# Patient Record
Sex: MALE | Race: WHITE | NOT HISPANIC OR LATINO | Employment: OTHER | URBAN - METROPOLITAN AREA
[De-identification: names, ages, dates, MRNs, and addresses within clinical notes are randomized per-mention and may not be internally consistent; named-entity substitution may affect disease eponyms.]

---

## 2017-03-09 ENCOUNTER — HOSPITAL ENCOUNTER (EMERGENCY)
Facility: HOSPITAL | Age: 42
Discharge: HOME | End: 2017-03-09
Attending: EMERGENCY MEDICINE
Payer: MEDICAID

## 2017-03-09 VITALS
SYSTOLIC BLOOD PRESSURE: 156 MMHG | WEIGHT: 200 LBS | HEART RATE: 75 BPM | HEIGHT: 69 IN | DIASTOLIC BLOOD PRESSURE: 88 MMHG | TEMPERATURE: 98 F | RESPIRATION RATE: 20 BRPM | OXYGEN SATURATION: 96 % | BODY MASS INDEX: 29.62 KG/M2

## 2017-03-09 DIAGNOSIS — M62.838 CERVICAL PARASPINAL MUSCLE SPASM: Primary | ICD-10-CM

## 2017-03-09 PROCEDURE — 99283 EMERGENCY DEPT VISIT LOW MDM: CPT | Mod: 25

## 2017-03-09 PROCEDURE — 63600175 PHARM REV CODE 636 W HCPCS: Performed by: PHYSICIAN ASSISTANT

## 2017-03-09 PROCEDURE — 96372 THER/PROPH/DIAG INJ SC/IM: CPT

## 2017-03-09 PROCEDURE — 25000003 PHARM REV CODE 250: Performed by: PHYSICIAN ASSISTANT

## 2017-03-09 RX ORDER — ETODOLAC 300 MG/1
300 CAPSULE ORAL 2 TIMES DAILY
Qty: 14 CAPSULE | Refills: 0 | Status: SHIPPED | OUTPATIENT
Start: 2017-03-09 | End: 2017-03-25

## 2017-03-09 RX ORDER — METHOCARBAMOL 500 MG/1
500 TABLET, FILM COATED ORAL 3 TIMES DAILY
Qty: 15 TABLET | Refills: 0 | Status: SHIPPED | OUTPATIENT
Start: 2017-03-09 | End: 2017-03-14

## 2017-03-09 RX ORDER — KETOROLAC TROMETHAMINE 30 MG/ML
10 INJECTION, SOLUTION INTRAMUSCULAR; INTRAVENOUS
Status: COMPLETED | OUTPATIENT
Start: 2017-03-09 | End: 2017-03-09

## 2017-03-09 RX ORDER — METHOCARBAMOL 500 MG/1
500 TABLET, FILM COATED ORAL
Status: COMPLETED | OUTPATIENT
Start: 2017-03-09 | End: 2017-03-09

## 2017-03-09 RX ADMIN — METHOCARBAMOL 500 MG: 500 TABLET ORAL at 12:03

## 2017-03-09 RX ADMIN — KETOROLAC TROMETHAMINE 10 MG: 30 INJECTION, SOLUTION INTRAMUSCULAR at 12:03

## 2017-03-09 NOTE — ED PROVIDER NOTES
Encounter Date: 3/9/2017    SCRIBE #1 NOTE: I, Dawit Mendoza, am scribing for, and in the presence of,  Yvan Walsh PA-C. I have scribed the following portions of the note - Other sections scribed: HPI, ROS.       History     Chief Complaint   Patient presents with    Shoulder Pain     left for the past few days, worse with movement, denies chest pain. unsure if he injured it     Review of patient's allergies indicates:  No Known Allergies  HPI Comments: CC: Shoulder Pain    HPI: This 42 y.o. male with a PMHx of asthma and pneumonia presents to the ED c/o progressive worsening left shoulder pain that radiates into his neck. 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 The pain is exacerbated with movement. No other symptoms reported. Pt denies any numbness or tingling in his extremities. Pt denies any fever, skin infections, IV drug use, headaches, or extremity weakness.       The history is provided by the patient.     Past Medical History:   Diagnosis Date    Asthma     Pneumonia      History reviewed. No pertinent surgical history.  Family History   Problem Relation Age of Onset    Hypertension Mother     Heart disease Father     COPD Father      Social History   Substance Use Topics    Smoking status: Current Every Day Smoker     Types: Cigarettes    Smokeless tobacco: None    Alcohol use No     Review of Systems   Constitutional: Negative for chills and fever.   HENT: Negative for congestion and sore throat.    Eyes: Negative for visual disturbance.   Respiratory: Negative for cough and shortness of breath.    Cardiovascular: Negative for chest pain.   Gastrointestinal: Negative for abdominal pain, diarrhea, nausea and vomiting.   Genitourinary: Negative for dysuria.   Musculoskeletal: Positive for myalgias (L Shoulder) and neck pain (L). Negative for back pain.   Skin: Negative for rash.   Neurological: Negative for weakness, numbness and headaches.    Hematological: Does not bruise/bleed easily.       Physical Exam   Initial Vitals   BP Pulse Resp Temp SpO2   03/09/17 1055 03/09/17 1055 03/09/17 1055 03/09/17 1055 03/09/17 1055   153/87 89 17 97.8 °F (36.6 °C) 99 %     Physical Exam    Vitals reviewed.  Constitutional: He appears well-developed and well-nourished. He is not diaphoretic. No distress.   HENT:   Head: Normocephalic and atraumatic.   Right Ear: External ear normal.   Left Ear: External ear normal.   Nose: Nose normal.   Eyes: Conjunctivae are normal. No scleral icterus.   Neck: Normal range of motion. Neck supple.   Cardiovascular: Normal rate, regular rhythm, normal heart sounds and intact distal pulses.   Pulmonary/Chest: Breath sounds normal. No respiratory distress. He has no wheezes. He has no rhonchi. He has no rales. He exhibits no tenderness.   Abdominal: Soft. He exhibits no distension and no mass. There is no tenderness. There is no rebound and no guarding.   Musculoskeletal: Normal range of motion. He exhibits tenderness (left trapezius). He exhibits no edema.   Left trapezius pain with abduction of left arm.  No C-spine tenderness.  No crepitus or dislocation of left shoulder.  No tenderness to the AC or glenohumeral joint.       Neurological: He is alert and oriented to person, place, and time.   Skin: Skin is warm and dry. No rash noted. No erythema.         ED Course   Procedures  Labs Reviewed - No data to display          Medical Decision Making:   History:   Old Medical Records: I decided to obtain old medical records.    42-year-old male with asthma complains of left shoulder pain ×2 days.  Patient performs manual labor regularly.  He denies fever, swelling, rash, IV drug use.  He presents well appearing in no distress with stable vital signs.  He has pain to the left trapezius with abduction of left arm.  There is mild tenderness of the left trapezius.  No evidence of C-spine injury.  Upper extremities are neurovascularly  intact.  Shoulder x-ray unremarkable.  I do not suspect dislocation, impingement, radiculopathy, lymphadenopathy.  Patient likely has a muscle strain versus spasm of the cervical paraspinal/trapezius.  Will treat with NSAIDs and Robaxin.  Return precautions given. Case discussed with Dr. Bhatt.          Scribe Attestation:   Scribe #1: I performed the above scribed service and the documentation accurately describes the services I performed. I attest to the accuracy of the note.    Attending Attestation:     Physician Attestation Statement for NP/PA:   I discussed this assessment and plan of this patient with the NP/PA, but I did not personally examine the patient. The face to face encounter was performed by the NP/PA.    Other NP/PA Attestation Additions:      Medical Decision Making: Agree with assessment and management.  Unremarkable shoulder x-rays.       Physician Attestation for Scribe:  Physician Attestation Statement for Scribe #1: I, Yvan Walsh PA-C, reviewed documentation, as scribed by Dawit Mendoza in my presence, and it is both accurate and complete.                 ED Course     Clinical Impression:   The encounter diagnosis was Cervical paraspinal muscle spasm.          Yvan Walsh PA-C  03/09/17 1216       Hector Bhatt MD  03/09/17 1300

## 2017-03-09 NOTE — ED AVS SNAPSHOT
[facility]  [place] [address]               Greg Rogers Jr.   3/9/2017 11:38 AM   ED    Description:  Male : 1975   Department:  [facility]           Your Care was Coordinated By:     Provider Role From To    Robinson Moreau MD Attending Provider 17 1140 17 1150    Hector Bhatt MD Attending Provider 17 1150 --    Yvan Walsh PA-C Physician Assistant 17 1140 --      Reason for Visit     Shoulder Pain           Diagnoses this Visit        Comments    Cervical paraspinal muscle spasm    -  Primary       ED Disposition     None           To Do List           Follow-up Information     Go to [facility].    Specialty:  Emergency Medicine    Why:  If symptoms worsen    Contact information:    [place] [id number] [phone]       These Medications        Disp Refills Start End    methocarbamol (ROBAXIN) 500 MG Tab 15 tablet 0 3/9/2017 3/14/2017    Take 1 tablet (500 mg total) by mouth 3 (three) times daily. - Oral    etodolac (LODINE) 300 MG Cap 14 capsule 0 3/9/2017     Take 1 capsule (300 mg total) by mouth 2 (two) times daily. - Oral      [facility]     [facility] Nurse Care [facility] -  Assistance  Registered nurses in the [facility] provide clinical advisement, health education, appointment booking, and other advisory services.  Call for this free service at [phone].             Medications           Message regarding Medications     Verify the changes and/or additions to your medication regime listed below are the same as discussed with your clinician today.  If any of these changes or additions are incorrect, please notify your healthcare provider.        START taking these NEW medications        Refills    methocarbamol (ROBAXIN) 500 MG Tab 0    Sig: Take 1 tablet (500 mg total) by mouth 3 (three) times daily.     "Class: Print    Route: Oral    etodolac (LODINE) 300 MG Cap 0    Sig: Take 1 capsule (300 mg total) by mouth 2 (two) times daily.    Class: Print    Route: Oral      These medications were administered today        Dose Freq    ketorolac injection 10 mg 10 mg ED 1 Time    Sig: Inject 10 mg into the muscle ED 1 Time.    Class: Normal    Route: Intramuscular    Cosign for Ordering: Required by Robinson Moreau MD    methocarbamol tablet 500 mg 500 mg ED 1 Time    Sig: Take 1 tablet (500 mg total) by mouth ED 1 Time.    Class: Normal    Route: Oral    Cosign for Ordering: Required by Robinson Moreau MD      STOP taking these medications     prednisoLONE (ORAPRED) 15 mg/5 mL solution Take 60mg on day 1, 50 mg on day 2, 40 mg on day 3, 30 mg on day 4, 15 mg on day 5    predniSONE (DELTASONE) 20 MG tablet 3 tablets po daily x 3 days  2 tablets po daily x 3 days  1 tablet po daily x 3 days    mupirocin (BACTROBAN) 2 % ointment Apply topically 3 (three) times daily.           Verify that the below list of medications is an accurate representation of the medications you are currently taking.  If none reported, the list may be blank. If incorrect, please contact your healthcare provider. Carry this list with you in case of emergency.           Current Medications     albuterol 90 mcg/actuation inhaler Inhale 1-2 puffs into the lungs every 6 (six) hours as needed for Wheezing.    etodolac (LODINE) 300 MG Cap Take 1 capsule (300 mg total) by mouth 2 (two) times daily.    ketorolac injection 10 mg Inject 10 mg into the muscle ED 1 Time.    methocarbamol (ROBAXIN) 500 MG Tab Take 1 tablet (500 mg total) by mouth 3 (three) times daily.    methocarbamol tablet 500 mg Take 1 tablet (500 mg total) by mouth ED 1 Time.           Clinical Reference Information           Your Vitals Were     BP Pulse Temp Resp Height Weight    153/87 (BP Location: Right arm, Patient Position: Sitting) 89 97.8 °F (36.6 °C) (Oral) 17 5' 9" (1.753 " m) 90.7 kg (200 lb)    SpO2 BMI             99% 29.53 kg/m2         Allergies as of 3/9/2017     No Known Allergies      Immunizations Administered on Date of Encounter - 3/9/2017     None      ED Micro, Lab, POCT     None      ED Imaging Orders     Start Ordered       Status Ordering Provider    03/09/17 1110 03/09/17 1109  X-Ray Shoulder 2 or More Views Left  1 time imaging      Final result       Discharge References/Attachments     NECK SPASM, NO TRAUMA (ENGLISH)      [facility] Sign-Up     Activating your [facility] account is as easy as 1-2-3!     1) Visit [email or web address], select Sign Up Now, enter this activation code and your date of birth, then select Next.  [email or web address]  Expires: 4/23/2017 11:58 AM      2) Create a username and password to use when you visit [facility] in the future and select a security question in case you lose your password and select Next.    3) Enter your e-mail address and click Sign Up!    Additional Information  If you have questions, please e-mail [email or web address] or call [phone] to talk to our [facility] staff. Remember, [facility] is NOT to be used for urgent needs. For medical emergencies, dial 911.          [facility] complies with applicable Federal civil rights laws and does not discriminate on the basis of race, color, national origin, age, disability, or sex.        Language Assistance Services     ATTENTION: Language assistance services are available, free of charge. Please call [phone].      ATENCIÓN: Si habla español, tiene a mattson disposición servicios gratuitos de asistencia lingüística. Llame al [phone].     CHÚ Ý: N?u b?n nói Ti?ng Vi?t, có các d?ch v? h? tr? ngôn ng? mi?n phí dành cho b?n. G?i s? [phone].

## 2017-03-25 ENCOUNTER — HOSPITAL ENCOUNTER (EMERGENCY)
Facility: HOSPITAL | Age: 42
Discharge: HOME | End: 2017-03-25
Attending: EMERGENCY MEDICINE
Payer: MEDICAID

## 2017-03-25 VITALS
DIASTOLIC BLOOD PRESSURE: 75 MMHG | HEART RATE: 74 BPM | WEIGHT: 165 LBS | HEIGHT: 69 IN | BODY MASS INDEX: 24.44 KG/M2 | TEMPERATURE: 98 F | OXYGEN SATURATION: 99 % | RESPIRATION RATE: 19 BRPM | SYSTOLIC BLOOD PRESSURE: 138 MMHG

## 2017-03-25 DIAGNOSIS — S63.91XA HAND SPRAIN, RIGHT, INITIAL ENCOUNTER: Primary | ICD-10-CM

## 2017-03-25 PROCEDURE — 29125 APPL SHORT ARM SPLINT STATIC: CPT

## 2017-03-25 PROCEDURE — 99283 EMERGENCY DEPT VISIT LOW MDM: CPT | Mod: 25

## 2017-03-25 PROCEDURE — 25000003 PHARM REV CODE 250: Performed by: EMERGENCY MEDICINE

## 2017-03-25 RX ORDER — IBUPROFEN 600 MG/1
600 TABLET ORAL EVERY 6 HOURS PRN
Qty: 20 TABLET | Refills: 0 | Status: SHIPPED | OUTPATIENT
Start: 2017-03-25 | End: 2019-07-20

## 2017-03-25 RX ORDER — IBUPROFEN 600 MG/1
600 TABLET ORAL
Status: COMPLETED | OUTPATIENT
Start: 2017-03-25 | End: 2017-03-25

## 2017-03-25 RX ORDER — HYDROCODONE BITARTRATE AND ACETAMINOPHEN 5; 325 MG/1; MG/1
2 TABLET ORAL
Status: COMPLETED | OUTPATIENT
Start: 2017-03-25 | End: 2017-03-25

## 2017-03-25 RX ADMIN — IBUPROFEN 600 MG: 600 TABLET, FILM COATED ORAL at 12:03

## 2017-03-25 RX ADMIN — HYDROCODONE BITARTRATE AND ACETAMINOPHEN 2 TABLET: 5; 325 TABLET ORAL at 12:03

## 2017-03-25 NOTE — ED TRIAGE NOTES
Swelling to right hand since yesterday working in yard may have hit it numbness to right ring and little finger

## 2017-03-25 NOTE — ED AVS SNAPSHOT
[facility]  [address] [place] [address]               Greg Rogers Jr.   3/25/2017 11:20 AM   ED    Description:  Male : 1975   Department:  [facility]           Your Care was Coordinated By:     Provider Role From To    Stanislaw Moroe MD Attending Provider 17 1126 --      Reason for Visit     Hand Pain           Diagnoses this Visit        Comments    Hand sprain, right, initial encounter    -  Primary       ED Disposition     None           To Do List           Follow-up Information     Follow up with Thierno Mendoza NP In 3 days.    Specialty:  Internal Medicine    Contact information:    [address] [place] [id number] [phone]         These Medications        Disp Refills Start End    ibuprofen (ADVIL,MOTRIN) 600 MG tablet 20 tablet 0 3/25/2017     Take 1 tablet (600 mg total) by mouth every 6 (six) hours as needed for Pain. - Oral      [facility]     [facility] Nurse Care [facility] -  Assistance  Registered nurses in the [facility] provide clinical advisement, health education, appointment booking, and other advisory services.  Call for this free service at [phone].             Medications           Message regarding Medications     Verify the changes and/or additions to your medication regime listed below are the same as discussed with your clinician today.  If any of these changes or additions are incorrect, please notify your healthcare provider.        START taking these NEW medications        Refills    ibuprofen (ADVIL,MOTRIN) 600 MG tablet 0    Sig: Take 1 tablet (600 mg total) by mouth every 6 (six) hours as needed for Pain.    Class: Print    Route: Oral      These medications were administered today        Dose Freq    ibuprofen tablet 600 mg 600 mg ED 1 Time    Sig: Take 1 tablet (600 mg total) by mouth ED 1 Time.    Class: Normal    Route: Oral    hydrocodone-acetaminophen  "5-325mg per tablet 2 tablet 2 tablet ED 1 Time    Sig: Take 2 tablets by mouth ED 1 Time.    Class: Normal    Route: Oral      STOP taking these medications     etodolac (LODINE) 300 MG Cap Take 1 capsule (300 mg total) by mouth 2 (two) times daily.           Verify that the below list of medications is an accurate representation of the medications you are currently taking.  If none reported, the list may be blank. If incorrect, please contact your healthcare provider. Carry this list with you in case of emergency.           Current Medications     albuterol 90 mcg/actuation inhaler Inhale 1-2 puffs into the lungs every 6 (six) hours as needed for Wheezing.    ibuprofen (ADVIL,MOTRIN) 600 MG tablet Take 1 tablet (600 mg total) by mouth every 6 (six) hours as needed for Pain.           Clinical Reference Information           Your Vitals Were     BP Pulse Temp Resp Height Weight    138/75 (BP Location: Left arm, Patient Position: Sitting, BP Method: Automatic) 74 97.7 °F (36.5 °C) (Oral) 19 5' 9" (1.753 m) 74.8 kg (165 lb)    SpO2 BMI             99% 24.37 kg/m2         Allergies as of 3/25/2017     No Known Allergies      Immunizations Administered on Date of Encounter - 3/25/2017     None      ED Micro, Lab, POCT     None      ED Imaging Orders     Start Ordered       Status Ordering Provider    03/25/17 1140 03/25/17 1139  X-Ray Hand 3 view Right  1 time imaging      Final result         Discharge Instructions       Wear your splint.  Please use a heating pad.  Rest.  Please make an appointment to see your primary care doctor this week.  Ibuprofen for pain and inflammation.  Rest.     [facility] Sign-Up     Activating your [facility] account is as easy as 1-2-3!     1) Visit [email or web address], select Sign Up Now, enter this activation code and your date of birth, then select Next.  [email or web address]  Expires: 4/23/2017 12:58 PM      2) Create a username and password to use when you visit [facility] in the future and " select a security question in case you lose your password and select Next.    3) Enter your e-mail address and click Sign Up!    Additional Information  If you have questions, please e-mail [email or web address] or call [phone] to talk to our [facility] staff. Remember, [facility] is NOT to be used for urgent needs. For medical emergencies, dial 911.         Smoking Cessation     If you would like to quit smoking:   You may be eligible for free services if you are a [place] resident and started smoking cigarettes before September 1, 1988.  Call the [employer]oking [employer]essation [employer] ([employer]) toll free at (648) 607-8825 or (725) 351-7516.   Call 1-069-QUIT-NOW if you do not meet the above criteria.             [facility] complies with applicable Federal civil rights laws and does not discriminate on the basis of race, color, national origin, age, disability, or sex.        Language Assistance Services     ATTENTION: Language assistance services are available, free of charge. Please call [phone].      ATENCIÓN: Si habla español, tiene a mattson disposición servicios gratuitos de asistencia lingüística. Llame al [phone].     CHÚ Ý: N?u b?n nói Ti?ng Vi?t, có các d?ch v? h? tr? ngôn ng? mi?n phí dành cho b?n. G?i s? [phone].

## 2017-03-25 NOTE — DISCHARGE INSTRUCTIONS
Wear your splint.  Please use a heating pad.  Rest.  Please make an appointment to see your primary care doctor this week.  Ibuprofen for pain and inflammation.  Rest.

## 2017-03-25 NOTE — ED PROVIDER NOTES
Encounter Date: 3/25/2017    SCRIBE #1 NOTE: I, Remedios Alcantar, am scribing for, and in the presence of,  Stanislaw Moore MD. I have scribed the following portions of the note - Other sections scribed: HPI/ROS.       History     Chief Complaint   Patient presents with    Hand Pain     Pt. presents with right hand pain after working in the garden. States pain is worse in 4th and 5 th fingers and the wrist.      Review of patient's allergies indicates:  No Known Allergies  HPI Comments: CC: Hand Pain    HPI: This 42 y.o. M smoker who has history of asthma presents to the ED for evaluation of acute onset severe pain to the ulnar aspect of the right hand with associated numbness and swelling to the 4th and 5th digit of the R hand. The pt states that he woke up with swelling and pain to the R hand this morning with no known trauma. The pt states that he was pulling up banana trees yesterday, but does not recall injuring his hand at that time. The pt denies fever, chills, N/V/D, weakness, and any other associated symptoms. No alleviating factors reported.    The history is provided by the patient. No  was used.     Past Medical History:   Diagnosis Date    Asthma     Pneumonia      History reviewed. No pertinent surgical history.  Family History   Problem Relation Age of Onset    Hypertension Mother     Heart disease Father     COPD Father      Social History   Substance Use Topics    Smoking status: Current Every Day Smoker     Types: Cigarettes    Smokeless tobacco: None    Alcohol use No     Review of Systems   Constitutional: Negative for chills and fever.   HENT: Negative for ear pain and sore throat.    Eyes: Negative for pain.   Respiratory: Negative for cough and shortness of breath.    Cardiovascular: Negative for chest pain.   Gastrointestinal: Negative for abdominal pain, diarrhea, nausea and vomiting.   Genitourinary: Negative for dysuria.   Musculoskeletal: Negative for myalgias.         (+) pain, to the ulnar aspect of the R hand   (+) swelling, to the 4th and 5th digits   Skin: Negative for rash and wound.   Neurological: Positive for numbness (to the 4th and 5th digit). Negative for headaches.       Physical Exam   Initial Vitals   BP Pulse Resp Temp SpO2   03/25/17 1004 03/25/17 1004 03/25/17 1004 03/25/17 1004 03/25/17 1004   136/71 84 16 97.7 °F (36.5 °C) 97 %     Physical Exam  The patient was examined specifically for the following:   General:No significant distress, Good color, Warm and dry. Head and neck:Scalp atraumatic, Neck supple. Neurological:Appropriate conversation, Gross motor deficits. Eyes:Conjugate gaze, Clear corneas. ENT: No epistaxis. Cardiac: Regular rate and rhythm, Grossly normal heart tones. Pulmonary: Wheezing, Rales. Gastrointestinal: Abdominal tenderness, Abdominal distention. Musculoskeletal: Extremity deformity, Apparent pain with range of motion of the joints. Skin: Rash.   The findings on examination were normal except for the following: The patient has tenderness of the right ulnar hand.  There is pale range of motion of the fourth and fifth fingers.  There is no deformity skin defect ecchymosis erythema warmth.  Neurovascular and tendon function are intact.   ED Course   Orthopedic Injury  Date/Time: 3/25/2017 12:48 PM  Authorized by: JOE MONIQUE   Performed by: JOE MONIQUE    Splint Application  Date/Time: 3/25/2017 12:48 PM  Performed by: JOE MONIQUE  Authorized by: JOE MONIQUE   Location details: right hand  Splint type: ulnar gutter  Supplies used: Ortho-Glass  Post-procedure: The splinted body part was neurovascularly unchanged following the procedure.  Patient tolerance: Patient tolerated the procedure well with no immediate complications        Procedure note: The splint was applied by the nurse.  I examined the patient after splint application.  There were no neurovascular or tendon injuries.  Labs Reviewed - No data to  display      Medical decision making: Given the above, this patient presents to emergency room with pain in the right lateral aspect of his hand.  The pain is worse with movement.  The patient is essentially otherwise well.  He is splinting his hand.  He has no other injuries or problems.  X-rays are negative.  I doubt fracture.  There is no clinical evidence of infection.  I believe this is musculoskeletal inflammation.                 Scribe Attestation:   Scribe #1: I performed the above scribed service and the documentation accurately describes the services I performed. I attest to the accuracy of the note.    Attending Attestation:           Physician Attestation for Scribe:  Physician Attestation Statement for Scribe #1: I, Stanislaw Moore MD, reviewed documentation, as scribed by Remedios Alcantar in my presence, and it is both accurate and complete.                 ED Course     Clinical Impression:   The encounter diagnosis was Hand sprain, right, initial encounter.          Stanislaw Moore MD  03/28/17 1952

## 2017-03-26 ENCOUNTER — HOSPITAL ENCOUNTER (EMERGENCY)
Facility: HOSPITAL | Age: 42
Discharge: HOME | End: 2017-03-26
Attending: EMERGENCY MEDICINE
Payer: MEDICAID

## 2017-03-26 VITALS
RESPIRATION RATE: 18 BRPM | TEMPERATURE: 99 F | SYSTOLIC BLOOD PRESSURE: 137 MMHG | DIASTOLIC BLOOD PRESSURE: 88 MMHG | OXYGEN SATURATION: 97 % | BODY MASS INDEX: 24.44 KG/M2 | WEIGHT: 165 LBS | HEART RATE: 98 BPM | HEIGHT: 69 IN

## 2017-03-26 DIAGNOSIS — M79.641 HAND PAIN, RIGHT: Primary | ICD-10-CM

## 2017-03-26 DIAGNOSIS — R20.2 RIGHT HAND PARESTHESIA: ICD-10-CM

## 2017-03-26 PROCEDURE — 99283 EMERGENCY DEPT VISIT LOW MDM: CPT | Mod: 25

## 2017-03-26 PROCEDURE — 96372 THER/PROPH/DIAG INJ SC/IM: CPT

## 2017-03-26 PROCEDURE — 29125 APPL SHORT ARM SPLINT STATIC: CPT | Mod: RT

## 2017-03-26 PROCEDURE — 63600175 PHARM REV CODE 636 W HCPCS: Performed by: NURSE PRACTITIONER

## 2017-03-26 RX ORDER — NAPROXEN 500 MG/1
500 TABLET ORAL 2 TIMES DAILY PRN
Qty: 10 TABLET | Refills: 0 | Status: SHIPPED | OUTPATIENT
Start: 2017-03-26 | End: 2017-03-31

## 2017-03-26 RX ORDER — BETAMETHASONE SODIUM PHOSPHATE AND BETAMETHASONE ACETATE 3; 3 MG/ML; MG/ML
12 INJECTION, SUSPENSION INTRA-ARTICULAR; INTRALESIONAL; INTRAMUSCULAR; SOFT TISSUE
Status: COMPLETED | OUTPATIENT
Start: 2017-03-26 | End: 2017-03-26

## 2017-03-26 RX ADMIN — BETAMETHASONE SODIUM PHOSPHATE AND BETAMETHASONE ACETATE 12 MG: 3; 3 INJECTION, SUSPENSION INTRA-ARTICULAR; INTRALESIONAL; INTRAMUSCULAR at 04:03

## 2017-03-26 NOTE — ED PROVIDER NOTES
Encounter Date: 3/26/2017       History     Chief Complaint   Patient presents with    Recheck     Seen here last PM with R hand fx's. Has CL splint on. States he is still having pain and numbness. Tips of fingers pink and warm     Review of patient's allergies indicates:  No Known Allergies  HPI Comments: CC: Hand Pain  HPI: Greg Rogers Jr., a 42 y.o. male that presents to the ED for Continued hand pain in his right hand. He was seen in this emergency department yesterday and diagnosed with hand sprain. He reports his pain is unchanged since yesterday.  He still denies any known injuries to the hand.  He has been taking ibuprofen without much relief. E reports his numbness to the right pinky still remains.      The history is provided by the patient and the spouse. No  was used.     Past Medical History:   Diagnosis Date    Asthma     Pneumonia      History reviewed. No pertinent surgical history.  Family History   Problem Relation Age of Onset    Hypertension Mother     Heart disease Father     COPD Father      Social History   Substance Use Topics    Smoking status: Current Every Day Smoker     Types: Cigarettes    Smokeless tobacco: None    Alcohol use No     Review of Systems   Constitutional: Negative for chills and fever.   Respiratory: Negative for shortness of breath.    Musculoskeletal: Positive for arthralgias (right 4th and 5th finger). Negative for neck pain.   Skin: Negative for rash and wound.   Neurological: Positive for numbness (right 5th digit). Negative for dizziness and syncope.   Psychiatric/Behavioral: Negative for confusion.       Physical Exam   Initial Vitals   BP Pulse Resp Temp SpO2   03/26/17 1528 03/26/17 1528 03/26/17 1528 03/26/17 1528 03/26/17 1528   137/88 98 18 98.5 °F (36.9 °C) 97 %     Physical Exam    Nursing note and vitals reviewed.  Constitutional: He appears well-developed and well-nourished. He is not diaphoretic. He is cooperative.   Non-toxic appearance. No distress.   HENT:   Head: Normocephalic and atraumatic.   Mouth/Throat: Oropharynx is clear and moist.   Eyes: Conjunctivae and EOM are normal.   Neck: Normal range of motion.   Cardiovascular: Normal rate and regular rhythm.   Pulses:       Radial pulses are 2+ on the right side, and 2+ on the left side.   Cap refill 2 seconds in all fingers of right hand.   Musculoskeletal:        Right elbow: Normal.       Right wrist: Normal.        Right hand: He exhibits tenderness. He exhibits normal two-point discrimination, normal capillary refill and no swelling. Decreased sensation is present in the ulnar distribution (along the 5th digit). Decreased sensation is not present in the medial distribution and is not present in the radial distribution. He exhibits no finger abduction, no thumb/finger opposition and no wrist extension trouble. Right ring finger: There is tenderness of the MCP and PIP joint. Right little finger: Exhibits decreased ROM (2/2 pain). There is tenderness of the MCP and PIP joint. Motor /Testing: The patient has normal right wrist strength. Wrist Extension: 5/5. Wrist Flexion: 5/5. : 5/5. Index Finger: 5/5. Middle Finger: 5/5. Ring Finger: 4/5. Little Finger: 4/5. Thumb APB: 5/5. Thumb FPL: 5/5. Pinch Mechanism: 5/5.        Hands:  Neurological: He is alert and oriented to person, place, and time. GCS eye subscore is 4. GCS verbal subscore is 5. GCS motor subscore is 6.   Skin: Skin is warm and dry. No rash noted.         ED Course   Procedures  Labs Reviewed - No data to display          Medical Decision Making:   History:   Old Records Summarized: records from previous admission(s).       <> Summary of Records: Patient seen in this emergency department yesterday.  Presented with hand pain after working in the garden.  Pain worsened with the fingers and wrist.  Diagnosis was hand sprain.  Prescribed ibuprofen.       APC / Resident Notes:   This is an evaluation a  42-year-old male that presents emergency Department with complaints of right hand pain since Friday. He was seen in this emergency department yesterday for the same.  Reports pain is worse.  He has a in Ortho-Glass ulnar gutter splint in place. Physical Exam shows a non-toxic, afebrile, and well appearing male. The ortho glass splint was removed any attempts inspected.  The patient has mild tender to palpation over the fourth and fifth MCP and PIP joints.  He has decreased range of motion with the fifth finger secondary to pain.  Normal range of motion with the fourth finger.  Decrease in sensation to the fifth digit of the right hand. Cap 2 seconds in all fingers on the right hand. There is no bruising, erythema, or increased warmth. Hand department and forearm compartments are soft.  +2 radial pulse. Vital Signs Are Reassuring. RESULTS: review with yesterday's x-ray shows no fracture.    My overall impression is hand pain and paresthesia. I considered, but at this time, do not suspect fracture, dislocation, septic joint, cellulitis, or compartment syndrome.    ED Course: [place]. D/C Meds: naproxen and reapply Ortho-Glass splint for comfort, RICE. Additional D/C Information: splint instructions and orthopedic follow-up. The diagnosis, treatment plan, instructions for follow-up and reevaluation with his PCP/orthopedics as well as ED return precautions were discussed and understanding was verbalized. All questions or concerns have been addressed. This case was discussed with Dr. Moore who is in agreement with my assessment and plan. ANIKET Johnson, FNP-C            Attending Attestation:     Physician Attestation Statement for NP/PA:   I discussed this assessment and plan of this patient with the NP/PA, but I did not personally examine the patient. The face to face encounter was performed by the NP/PA.                  ED Course     Clinical Impression:   The primary encounter diagnosis was Hand pain, right. A  diagnosis of Right hand paresthesia was also pertinent to this visit.    Disposition:   Disposition: Discharged  Condition: Stable       REBECCA Mendez  03/26/17 1642       Stanislaw Moore MD  03/28/17 0058

## 2017-03-26 NOTE — ED AVS SNAPSHOT
[facility]  [place] [address]               Greg Rogers Jr.   3/26/2017  3:36 PM   ED    Description:  Male : 1975   Department:  [facility]           Your Care was Coordinated By:     Provider Role From To    Stanislaw Moore MD Attending Provider 17 0114 --    REBECCA Mendez Nurse Practitioner 17 [id number] --      Reason for Visit     Recheck           Diagnoses this Visit        Comments    Hand pain, right    -  Primary     Right hand paresthesia           ED Disposition     ED Disposition Condition Comment    Discharge             To Do List           Follow-up Information     Schedule an appointment as soon as possible for a visit with Thierno Mendoza NP.    Specialty:  Internal Medicine    Why:  This Week, For Follow-Up    Contact information:    [place] [address] [phone]          Schedule an appointment as soon as possible for a visit with Malik Louise - Orthopedics.    Specialty:  Orthopedics    Why:  For Follow-Up if pain persists.     Contact information:    [address]  [facility] [address] [phone]    Additional information:    [address] - 5th Floor        Go to [facility].    Specialty:  Emergency Medicine    Why:  If symptoms worsen    Contact information:    [place] [address] [phone]       These Medications        Disp Refills Start End    naproxen (NAPROSYN) 500 MG tablet 10 tablet 0 3/26/2017 3/31/2017    Take 1 tablet (500 mg total) by mouth 2 (two) times daily as needed (Pain). Take With Meals - Oral      [facility]     [facility] Care Line -  Assistance  Registered nurses in the [facility] provide clinical advisement, health education, appointment booking, and other advisory services.  Call for this free service at [phone].             Medications          "  Message regarding Medications     Verify the changes and/or additions to your medication regime listed below are the same as discussed with your clinician today.  If any of these changes or additions are incorrect, please notify your healthcare provider.        START taking these NEW medications        Refills    naproxen (NAPROSYN) 500 MG tablet 0    Sig: Take 1 tablet (500 mg total) by mouth 2 (two) times daily as needed (Pain). Take With Meals    Class: Print    Route: Oral      These medications were administered today        Dose Freq    betamethasone acetate-betamethasone sodium phosphate injection 12 mg 12 mg ED 1 Time    Sig: Inject 2 mLs (12 mg total) into the muscle ED 1 Time.    Class: Normal    Route: Intramuscular           Verify that the below list of medications is an accurate representation of the medications you are currently taking.  If none reported, the list may be blank. If incorrect, please contact your healthcare provider. Carry this list with you in case of emergency.           Current Medications     ibuprofen (ADVIL,MOTRIN) 600 MG tablet Take 1 tablet (600 mg total) by mouth every 6 (six) hours as needed for Pain.    albuterol 90 mcg/actuation inhaler Inhale 1-2 puffs into the lungs every 6 (six) hours as needed for Wheezing.    betamethasone acetate-betamethasone sodium phosphate injection 12 mg Inject 2 mLs (12 mg total) into the muscle ED 1 Time.    naproxen (NAPROSYN) 500 MG tablet Take 1 tablet (500 mg total) by mouth 2 (two) times daily as needed (Pain). Take With Meals           Clinical Reference Information           Your Vitals Were     BP Pulse Temp Resp Height Weight    137/88 (BP Location: Left arm, Patient Position: Sitting) 98 98.5 °F (36.9 °C) (Oral) 18 5' 9" (1.753 m) 74.8 kg (165 lb)    SpO2 BMI             97% 24.37 kg/m2         Allergies as of 3/26/2017     No Known Allergies      Immunizations Administered on Date of Encounter - 3/26/2017     None      ED Micro, " Lab, POCT     None      ED Imaging Orders     None        Discharge Instructions       Please return to the Emergency Department for any new or worsening symptoms including: worsening pain or numbness in the hand, fever, chest pain, shortness of breath, loss of consciousness, dizziness, weakness, or any other concerns.     Please follow up with your Primary Care Provider within in the week for a recheck of the hand and to check on your splint. Please follow up with Orthopedics if pain continues. Please use the splint as needed to help with the pain.     Please take all medication as prescribed.  You have been prescribed Naproxen for pain. This is an Non-Steroidal Anti-Inflammatory (NSAID) Medication. Please do not take any additional NSAIDs while you are taking this medication including (Advil, Aleve, Motrin, Ibuprofen, Mobic\meloxicam, Naprosyn, etc.). Please stop taking this medication if you experience: weakness, itching, yellow skin or eyes, joint pains, vomiting blood, blood or black stools, unusual weight gain, or swelling in your arms, legs, hands, or feet.     Emergency Department Survey:  Our goal in the emergency department is to always give you outstanding care and exceptional service. You may receive a survey by mail or e-mail in the next week regarding your experience in our ED. We would greatly appreciate your completing and returning the survey. Your feedback provides us with a way to recognize our staff who give very good care and it helps us learn how to improve when your experience was below our aspiration of excellence.       Discharge References/Attachments     HAND SPRAIN (ENGLISH)    PARAESTHESIAS (ENGLISH)    SPLINT CARE, FIBERGLASS (ENGLISH)      [facility] Sign-Up     Activating your [facility] account is as easy as 1-2-3!     1) Visit [email or web address], select Sign Up Now, enter this activation code and your date of birth, then select Next.  [email or web address]  Expires: 4/23/2017 12:58 PM      2)  Create a username and password to use when you visit [facility] in the future and select a security question in case you lose your password and select Next.    3) Enter your e-mail address and click Sign Up!    Additional Information  If you have questions, please e-mail [email or web address] or call [phone] to talk to our [facility] staff. Remember, [facility] is NOT to be used for urgent needs. For medical emergencies, dial 911.         Smoking Cessation     If you would like to quit smoking:   You may be eligible for free services if you are a [place] resident and started smoking cigarettes before September 1, 1988.  Call the Smoking Cessation Trust (SCT) toll free at (797) 342-4202 or (530) 315-7340.   Call 7-542-QUIT-NOW if you do not meet the above criteria.             [facility] complies with applicable Federal civil rights laws and does not discriminate on the basis of race, color, national origin, age, disability, or sex.        Language Assistance Services     ATTENTION: Language assistance services are available, free of charge. Please call [phone].      ATENCIÓN: Si habla español, tiene a mattson disposición servicios gratuitos de asistencia lingüística. Llame al [phone].     CHÚ Ý: N?u b?n nói Ti?ng Vi?t, có các d?ch v? h? tr? ngôn ng? mi?n phí dành cho b?n. G?i s? [phone].

## 2017-03-26 NOTE — DISCHARGE INSTRUCTIONS
Please return to the Emergency Department for any new or worsening symptoms including: worsening pain or numbness in the hand, fever, chest pain, shortness of breath, loss of consciousness, dizziness, weakness, or any other concerns.     Please follow up with your Primary Care Provider within in the week for a recheck of the hand and to check on your splint. Please follow up with Orthopedics if pain continues. Please use the splint as needed to help with the pain.     Please take all medication as prescribed.  You have been prescribed Naproxen for pain. This is an Non-Steroidal Anti-Inflammatory (NSAID) Medication. Please do not take any additional NSAIDs while you are taking this medication including (Advil, Aleve, Motrin, Ibuprofen, Mobic\meloxicam, Naprosyn, etc.). Please stop taking this medication if you experience: weakness, itching, yellow skin or eyes, joint pains, vomiting blood, blood or black stools, unusual weight gain, or swelling in your arms, legs, hands, or feet.     Emergency Department Survey:  Our goal in the emergency department is to always give you outstanding care and exceptional service. You may receive a survey by mail or e-mail in the next week regarding your experience in our ED. We would greatly appreciate your completing and returning the survey. Your feedback provides us with a way to recognize our staff who give very good care and it helps us learn how to improve when your experience was below our aspiration of excellence.

## 2017-03-26 NOTE — ED TRIAGE NOTES
Patient states he was seen yesterday and diagnosed with a hairline fractures to his right 4th and 5th digits. Patient c/o of numbness and increased pain.

## 2017-04-10 ENCOUNTER — HOSPITAL ENCOUNTER (EMERGENCY)
Facility: HOSPITAL | Age: 42
Discharge: HOME | End: 2017-04-10
Attending: EMERGENCY MEDICINE
Payer: MEDICAID

## 2017-04-10 VITALS
OXYGEN SATURATION: 97 % | TEMPERATURE: 98 F | HEIGHT: 69 IN | BODY MASS INDEX: 23.85 KG/M2 | DIASTOLIC BLOOD PRESSURE: 83 MMHG | WEIGHT: 161 LBS | HEART RATE: 96 BPM | SYSTOLIC BLOOD PRESSURE: 132 MMHG | RESPIRATION RATE: 16 BRPM

## 2017-04-10 DIAGNOSIS — K08.89 PAIN, DENTAL: Primary | ICD-10-CM

## 2017-04-10 PROCEDURE — 99283 EMERGENCY DEPT VISIT LOW MDM: CPT

## 2017-04-10 RX ORDER — DICLOFENAC SODIUM 50 MG/1
50 TABLET, DELAYED RELEASE ORAL 3 TIMES DAILY
Qty: 30 TABLET | Refills: 0 | Status: SHIPPED | OUTPATIENT
Start: 2017-04-10 | End: 2018-12-10

## 2017-04-10 NOTE — ED AVS SNAPSHOT
[facility] [address] [place] [address]               Greg Rogers Jr.   4/10/2017  4:15 PM   ED    Description:  Male : 1975   Department:  [facility]           Your Care was Coordinated By:     Provider Role From To    Regan Hicks MD Attending Provider 04/10/17 8988 --    Harriet Reed NP Nurse Practitioner 04/10/17 4134 --      Reason for Visit     Dental Pain           Diagnoses this Visit        Comments    Pain, dental    -  Primary       ED Disposition     None           To Do List           Follow-up Information     Follow up with [facility].    Specialty:  Emergency Medicine    Why:  As needed, If symptoms worsen    Contact information:    [address] [place] [address] [phone]        Follow up with Your dentist as soon as possible.       These Medications        Disp Refills Start End    diclofenac (VOLTAREN) 50 MG EC tablet 30 tablet 0 4/10/2017     Take 1 tablet (50 mg total) by mouth 3 (three) times daily. - Oral      [facility]     [facility] Care [facility] -  Assistance  Unless otherwise directed by your provider, please contact [facility], our nurse care line that is available for  assistance.     Registered nurses in the [facility] provide: appointment scheduling, clinical advisement, health education, and other advisory services.  Call: [phone] (toll free)               Medications           Message regarding Medications     Verify the changes and/or additions to your medication regime listed below are the same as discussed with your clinician today.  If any of these changes or additions are incorrect, please notify your healthcare provider.        START taking these NEW medications        Refills    diclofenac (VOLTAREN) 50 MG EC tablet 0    Sig: Take 1 tablet (50 mg total) by mouth 3 (three) times daily.     "Class: Print    Route: Oral           Verify that the below list of medications is an accurate representation of the medications you are currently taking.  If none reported, the list may be blank. If incorrect, please contact your healthcare provider. Carry this list with you in case of emergency.           Current Medications     albuterol 90 mcg/actuation inhaler Inhale 1-2 puffs into the lungs every 6 (six) hours as needed for Wheezing.    diclofenac (VOLTAREN) 50 MG EC tablet Take 1 tablet (50 mg total) by mouth 3 (three) times daily.    ibuprofen (ADVIL,MOTRIN) 600 MG tablet Take 1 tablet (600 mg total) by mouth every 6 (six) hours as needed for Pain.           Clinical Reference Information           Your Vitals Were     BP Pulse Temp Resp Height Weight    132/83 (BP Location: Right arm, Patient Position: Sitting) 96 98.3 °F (36.8 °C) (Oral) 16 5' 9" (1.753 m) 73 kg (161 lb)    SpO2 BMI             97% 23.78 kg/m2         Allergies as of 4/10/2017     No Known Allergies      Immunizations Administered on Date of Encounter - 4/10/2017     None      ED Micro, Lab, POCT     None      ED Imaging Orders     None      Discharge References/Attachments     DENTAL PAIN (ENGLISH)      [facility] Sign-Up     Activating your [facility] account is as easy as 1-2-3!     1) Visit [email or web address], select Sign Up Now, enter this activation code and your date of birth, then select Next.  [email or web address]  Expires: 4/23/2017 12:58 PM      2) Create a username and password to use when you visit [facility] in the future and select a security question in case you lose your password and select Next.    3) Enter your e-mail address and click Sign Up!    Additional Information  If you have questions, please e-mail [email or web address] or call [phone] to talk to our [facility] staff. Remember, [facility] is NOT to be used for urgent needs. For medical emergencies, dial 911.         Smoking Cessation     If you would like to quit " smoking:   You may be eligible for free services if you are a [place] resident and started smoking cigarettes before September 1, 1988.  Call the Smoking Cessation Trust (SCT) toll free at (854) 634-1939 or (009) 182-1236.   Call 1-800-QUIT-NOW if you do not meet the above criteria.   Contact us via email: [email or web address]   View our website for more information: [email or web address]         [facility] complies with applicable Federal civil rights laws and does not discriminate on the basis of race, color, national origin, age, disability, or sex.        Language Assistance Services     ATTENTION: Language assistance services are available, free of charge. Please call [phone].      ATENCIÓN: Si habla antonioañol, tiene a mattson disposición servicios gratuitos de asistencia lingüística. Llame al [phone].     CHÚ Ý: N?u b?n nói Ti?ng Vi?t, có các d?ch v? h? tr? ngôn ng? mi?n phí dành cho b?n. G?i s? [phone].

## 2017-04-10 NOTE — ED NOTES
Patient identifiers for Greg Rogers Jr. checked and correct.  LOC: Patient is awake, alert, and aware of environment with an appropriate affect. Patient is oriented x 3 and speaking appropriately.  APPEARANCE: Patient resting comfortably and in no acute distress. Patient is clean and well groomed, patient's clothing is properly fastened. Multiple decayed teeth noted.  SKIN: The skin is warm and dry. Patient has normal skin turgor and moist mucus membrances. Skin is intact; no bruising or breakdown noted.  MUSKULOSKELETAL: Patient is moving all extremities well, no obvious deformities noted. Pulses intact.   RESPIRATORY: Airway is open and patent. Respirations are spontaneous and non-labored with normal effort and rate.  CARDIAC: Patient has a normal rate and rhythm. No peripheral edema noted. Capillary refill < 3 seconds.  ABDOMEN: No distention noted. Bowel sounds active in all 4 quadrants. Soft and non-tender upon palpation.  NEUROLOGICAL: PERRL. Facial expression is symmetrical. Hand grasps are equal bilaterally. Normal sensation in all extremities when touched with finger.  Allergies reported: Review of patient's allergies indicates:  No Known Allergies

## 2017-04-11 NOTE — ED PROVIDER NOTES
"Encounter Date: 4/10/2017       History     Chief Complaint   Patient presents with    Dental Pain     after having tooth pulled last week.     Review of patient's allergies indicates:  No Known Allergies  HPI Comments: Greg Rogers is a 42 year old male with pmh asthma presenting to the ED with c/o left upper dental pain. The patient had a dental extraction next to the painful tooth one week ago and reports he finished his norco and amoxicillin one day ago. He states that he called the dentist but his dentist is out of town for another week and he "doesn't trust anybody else over there". He denies fever, difficulty swallowing/breathing, chills, facial swelling.     The history is provided by the patient.     Past Medical History:   Diagnosis Date    Asthma     Pneumonia      History reviewed. No pertinent surgical history.  Family History   Problem Relation Age of Onset    Hypertension Mother     Heart disease Father     COPD Father      Social History   Substance Use Topics    Smoking status: Current Every Day Smoker     Packs/day: 1.00     Types: Cigarettes    Smokeless tobacco: None    Alcohol use No     Review of Systems   Constitutional: Negative.    HENT: Positive for dental problem. Negative for facial swelling, sore throat and trouble swallowing.    Respiratory: Negative.    Cardiovascular: Negative.    Gastrointestinal: Negative.    Genitourinary: Negative.    Musculoskeletal: Negative.    Skin: Negative.    Neurological: Negative.        Physical Exam   Initial Vitals   BP Pulse Resp Temp SpO2   04/10/17 1613 04/10/17 1613 04/10/17 1613 04/10/17 1613 04/10/17 1613   132/83 96 16 98.3 °F (36.8 °C) 97 %     Physical Exam    Nursing note and vitals reviewed.  Constitutional: He appears well-developed and well-nourished. He is not diaphoretic. No distress.   HENT:   Head: Normocephalic and atraumatic.   Mouth/Throat: Abnormal dentition. Dental caries present. No dental abscesses.       Eyes: " Conjunctivae are normal.   Neck: Normal range of motion.   Pulmonary/Chest: No respiratory distress.   Musculoskeletal: Normal range of motion.   Neurological: He is alert and oriented to person, place, and time.   Skin: Skin is warm and dry.   Psychiatric: He has a normal mood and affect.         ED Course   Procedures  Labs Reviewed - No data to display                APC / Resident Notes:   Greg Rogers Jr. is a 42 y.o. male who presents today complaining of dental pain. The patient notes that they have had poor dentition for quite some time however recently it has become very painful. The patient denies any fevers, chills, nausea, vomiting, diarrhea/dysurea, neck stiffness, photophobia, or any other complaints. The pt is tolerating po's.  I decided to obtain and review the old medical record.      The patient appears to have pulpitis with chronic dental caries.  Based upon the history and physical I see no signs of Karson's angina, sublingual swelling, facial swelling, airway compromise, facial cellulitis, sepsis, dehydration, or a fluctuant abscess to drain.     I believe the patient can be discharged home with anti-inflammatories. I advised him that no additional narcotics would be prescribed at this time and that he should contact his dentist for further treatment. The patient has been given specific return precautions and instructed to follow up with a dentist.    Dental Referral List will be given upon discharge.    The results and physical exam findings were reviewed with the patient. Pt agrees with assessment, disposition and treatment plan and has no further questions or complaints at this time.    ANIKET Leal, FNP-C. 7:03 PM 4/10/2017             Attending Attestation:     Physician Attestation Statement for NP/PA:   I discussed this assessment and plan of this patient with the NP/PA, but I did not personally examine the patient. The face to face encounter was performed by the  NP/PA.    Other NP/PA Attestation Additions:    History of Present Illness: 42-year-old male presents with a chief complaint of a toothache.    Medical Decision Making: Initial differential diagnosis included but not limited to tooth abscess, tooth fracture, and pulpitis.  I am in agreement with the nurse practitioner's assessment, treatment, and plan of care.                 ED Course     Clinical Impression:   The encounter diagnosis was Pain, dental.    Disposition:   Disposition: Discharged  Condition: Stable       Harriet Reed NP  04/10/17 6661       Regan Hicks MD  04/10/17 6020

## 2018-06-21 ENCOUNTER — HOSPITAL ENCOUNTER (EMERGENCY)
Facility: HOSPITAL | Age: 43
Discharge: HOME | End: 2018-06-21
Attending: EMERGENCY MEDICINE
Payer: MEDICAID

## 2018-06-21 VITALS
BODY MASS INDEX: 27.11 KG/M2 | HEIGHT: 69 IN | SYSTOLIC BLOOD PRESSURE: 143 MMHG | RESPIRATION RATE: 16 BRPM | WEIGHT: 183 LBS | DIASTOLIC BLOOD PRESSURE: 86 MMHG | TEMPERATURE: 98 F | OXYGEN SATURATION: 98 % | HEART RATE: 86 BPM

## 2018-06-21 DIAGNOSIS — L03.114 CELLULITIS OF LEFT UPPER EXTREMITY: Primary | ICD-10-CM

## 2018-06-21 PROCEDURE — 99283 EMERGENCY DEPT VISIT LOW MDM: CPT

## 2018-06-21 RX ORDER — METFORMIN HYDROCHLORIDE 500 MG/1
1000 TABLET ORAL 2 TIMES DAILY WITH MEALS
COMMUNITY

## 2018-06-21 RX ORDER — CLINDAMYCIN HYDROCHLORIDE 150 MG/1
150 CAPSULE ORAL 4 TIMES DAILY
Qty: 28 CAPSULE | Refills: 0 | Status: SHIPPED | OUTPATIENT
Start: 2018-06-21 | End: 2018-06-28

## 2018-06-21 NOTE — ED PROVIDER NOTES
Encounter Date: 6/21/2018       History     Chief Complaint   Patient presents with    Arm Pain     pt reports pain and redness to left forearm where tattoo is located, reports tattoo done on saturday 5 days ago. denies fever or drainage.       had a tattoo placed a few days ago and now has redness and pain to the tattoo site.      The history is provided by the patient.   Arm Pain   This is a new problem. The current episode started 2 days ago. The problem occurs constantly. The problem has been gradually worsening. Pertinent negatives include no chest pain, no abdominal pain, no headaches and no shortness of breath. Nothing aggravates the symptoms. Nothing relieves the symptoms. He has tried nothing for the symptoms.     Review of patient's allergies indicates:  No Known Allergies  Past Medical History:   Diagnosis Date    Asthma     Chronic back pain     Depression     Pneumonia      History reviewed. No pertinent surgical history.  Family History   Problem Relation Age of Onset    Hypertension Mother     Heart disease Father     COPD Father      Social History   Substance Use Topics    Smoking status: Former Smoker     Packs/day: 1.00     Types: Cigarettes    Smokeless tobacco: Not on file    Alcohol use No      Comment: former      Review of Systems   Constitutional: Negative.    HENT: Negative.    Eyes: Negative.    Respiratory: Negative.  Negative for shortness of breath.    Cardiovascular: Negative.  Negative for chest pain.   Gastrointestinal: Negative.  Negative for abdominal pain.   Endocrine: Negative.    Genitourinary: Negative.    Musculoskeletal: Negative.    Skin: Positive for rash.   Allergic/Immunologic: Negative.    Neurological: Negative.  Negative for headaches.   Hematological: Negative.    Psychiatric/Behavioral: Negative.    All other systems reviewed and are negative.      Physical Exam     Initial Vitals [06/21/18 0011]   BP Pulse Resp Temp SpO2   (!) 143/86 86 16 97.5 °F  (36.4 °C) 98 %      MAP       --         Physical Exam    Nursing note and vitals reviewed.  Constitutional: Vital signs are normal. He appears well-developed. He is active and cooperative.   HENT:   Head: Normocephalic and atraumatic.   Eyes: Conjunctivae, EOM and lids are normal. Pupils are equal, round, and reactive to light.   Neck: Trachea normal and full passive range of motion without pain. Neck supple. No thyroid mass present.   Cardiovascular: Normal rate, regular rhythm, S1 normal, S2 normal, normal heart sounds, intact distal pulses and normal pulses.   Abdominal: Soft. Normal appearance, normal aorta and bowel sounds are normal.   Musculoskeletal: Normal range of motion.   Lymphadenopathy:     He has no axillary adenopathy.   Neurological: He is alert and oriented to person, place, and time.   Skin: Skin is warm, dry and intact.        Psychiatric: He has a normal mood and affect. His speech is normal and behavior is normal. Judgment and thought content normal. Cognition and memory are normal.         ED Course   Procedures  Labs Reviewed - No data to display       Imaging Results    None                               Clinical Impression:   The encounter diagnosis was Cellulitis of left upper extremity.                             Gabriel Dubon MD  06/21/18 0020

## 2018-08-29 ENCOUNTER — HOSPITAL ENCOUNTER (EMERGENCY)
Facility: HOSPITAL | Age: 43
Discharge: HOME | End: 2018-08-29
Attending: EMERGENCY MEDICINE
Payer: MEDICAID

## 2018-08-29 VITALS
SYSTOLIC BLOOD PRESSURE: 117 MMHG | OXYGEN SATURATION: 99 % | BODY MASS INDEX: 26.66 KG/M2 | HEART RATE: 82 BPM | HEIGHT: 69 IN | WEIGHT: 180 LBS | RESPIRATION RATE: 16 BRPM | TEMPERATURE: 99 F | DIASTOLIC BLOOD PRESSURE: 74 MMHG

## 2018-08-29 DIAGNOSIS — R05.9 COUGH: ICD-10-CM

## 2018-08-29 DIAGNOSIS — K21.00 GASTROESOPHAGEAL REFLUX DISEASE WITH ESOPHAGITIS: Primary | ICD-10-CM

## 2018-08-29 DIAGNOSIS — J32.9 RHINOSINUSITIS: ICD-10-CM

## 2018-08-29 PROCEDURE — 99284 EMERGENCY DEPT VISIT MOD MDM: CPT

## 2018-08-29 RX ORDER — MONTELUKAST SODIUM 10 MG/1
10 TABLET ORAL NIGHTLY
Qty: 30 TABLET | Refills: 0 | Status: SHIPPED | OUTPATIENT
Start: 2018-08-29 | End: 2018-09-28

## 2018-08-29 RX ORDER — PANTOPRAZOLE SODIUM 20 MG/1
20 TABLET, DELAYED RELEASE ORAL DAILY
Qty: 30 TABLET | Refills: 0 | Status: SHIPPED | OUTPATIENT
Start: 2018-08-29 | End: 2018-12-10

## 2018-08-29 RX ORDER — PREDNISONE 20 MG/1
40 TABLET ORAL DAILY
Qty: 10 TABLET | Refills: 0 | Status: SHIPPED | OUTPATIENT
Start: 2018-08-29 | End: 2018-09-03

## 2018-08-29 RX ORDER — IPRATROPIUM BROMIDE AND ALBUTEROL SULFATE 2.5; .5 MG/3ML; MG/3ML
3 SOLUTION RESPIRATORY (INHALATION)
Status: DISCONTINUED | OUTPATIENT
Start: 2018-08-29 | End: 2018-08-29

## 2018-08-29 RX ORDER — FLUTICASONE PROPIONATE 50 MCG
1 SPRAY, SUSPENSION (ML) NASAL 2 TIMES DAILY PRN
Qty: 16 G | Refills: 0 | Status: SHIPPED | OUTPATIENT
Start: 2018-08-29

## 2018-08-29 RX ORDER — FAMOTIDINE 20 MG/1
20 TABLET, FILM COATED ORAL 2 TIMES DAILY
Qty: 20 TABLET | Refills: 0 | Status: SHIPPED | OUTPATIENT
Start: 2018-08-29 | End: 2018-12-10

## 2018-08-29 RX ORDER — LORATADINE 10 MG/1
10 TABLET ORAL DAILY
Qty: 30 TABLET | Refills: 0 | Status: SHIPPED | OUTPATIENT
Start: 2018-08-29 | End: 2018-12-10

## 2018-08-29 RX ORDER — HYDROXYZINE PAMOATE 50 MG/1
50 CAPSULE ORAL NIGHTLY PRN
Qty: 20 CAPSULE | Refills: 0 | Status: SHIPPED | OUTPATIENT
Start: 2018-08-29 | End: 2018-12-10

## 2018-08-29 NOTE — ED PROVIDER NOTES
Encounter Date: 8/29/2018       History     Chief Complaint   Patient presents with    Cough     presents with c/o productive cough with yellow-green sputum, body aches, fever x3 wk; pt was seen by PCP with Dx of bronchitis; pt was placed on antibiotic, states he completed course last week; pt states he was seen again due to no improvement with Dx URI; pt states symptoms still have not improved; Hx of asthma     43 y.o. Male with tobacco abuse, asthma and others presents to the emergency department complaining of productive cough with brown/green sputum that has been present for about 3 weeks.  He reports being seen by his doctor and prescribed 2 week course of doxycycline which he completed last Friday.  He also reports completing a 5 day course of prednisone on Friday as well. He reports persistent cough despite taking these medications.  He states coughing spells make him feel is awake him abrasion was causes him to have panic attacks.  He also reports nasal congestion, subjective fever and chills, postnasal drip, sore throat and body aches.  He reports taking Tylenol at 8:00 p.m. for subjective fever.      The history is provided by the patient.     Review of patient's allergies indicates:  No Known Allergies  Past Medical History:   Diagnosis Date    Asthma     Chronic back pain     Depression     Pneumonia      No past surgical history on file.  Family History   Problem Relation Age of Onset    Hypertension Mother     Heart disease Father     COPD Father      Social History     Tobacco Use    Smoking status: Former Smoker     Packs/day: 1.00     Types: Cigarettes   Substance Use Topics    Alcohol use: No     Comment: former     Drug use: No     Review of Systems   Constitutional: Positive for appetite change (Decreased) and fever (Subjective). Negative for chills.   HENT: Positive for congestion, postnasal drip and sore throat. Negative for rhinorrhea, sneezing, trouble swallowing and voice change.     Eyes: Negative.  Negative for visual disturbance.   Respiratory: Positive for cough and wheezing. Negative for shortness of breath.    Cardiovascular: Negative for chest pain, palpitations and leg swelling.   Gastrointestinal: Negative for abdominal pain, constipation, diarrhea, nausea and vomiting.   Genitourinary: Negative for dysuria and hematuria.   Musculoskeletal: Negative.    Skin: Negative.    Neurological: Negative for dizziness, weakness and headaches.   Psychiatric/Behavioral: Negative.    All other systems reviewed and are negative.      Physical Exam     Initial Vitals [08/29/18 0139]   BP Pulse Resp Temp SpO2   139/86 98 20 99.2 °F (37.3 °C) 97 %      MAP       --         Physical Exam    Nursing note and vitals reviewed.  Constitutional: He appears well-developed and well-nourished. He is not diaphoretic. No distress.   HENT:   Head: Normocephalic and atraumatic.   Mouth/Throat: Oropharynx is clear and moist.   Eyes: Conjunctivae and EOM are normal.   Neck: Normal range of motion and phonation normal. Neck supple. No stridor present.   Cardiovascular: Regular rhythm and intact distal pulses.   Pulmonary/Chest: No accessory muscle usage. No tachypnea. No respiratory distress. He has no decreased breath sounds. He has wheezes in the right lower field. He has no rhonchi.   Abdominal: He exhibits no distension. There is no tenderness.   Musculoskeletal: Normal range of motion. He exhibits no tenderness.   Neurological: He is alert and oriented to person, place, and time.   Skin: Skin is warm and intact.   Psychiatric: He has a normal mood and affect.         ED Course   Procedures  Labs Reviewed - No data to display       Imaging Results          X-Ray Chest PA And Lateral (Final result)  Result time 08/29/18 02:40:13    Final result by Lenin Galindo MD (08/29/18 02:40:13)                 Impression:      No acute cardiopulmonary process.      Electronically signed by: Lenin Galindo  MD  Date:    08/29/2018  Time:    02:40             Narrative:    EXAMINATION:  XR CHEST PA AND LATERAL    CLINICAL HISTORY:  Cough    TECHNIQUE:  PA and lateral views of the chest were performed.    COMPARISON:  March 25, 2014.    FINDINGS:  There is no consolidation, effusion, or pneumothorax.    Cardiomediastinal silhouette is unremarkable.    Regional osseous structures are unremarkable.                                                 Labs Reviewed       Imaging Reviewed    Imaging Results          X-Ray Chest PA And Lateral (Final result)  Result time 08/29/18 02:40:13    Final result by Lenin Galindo MD (08/29/18 02:40:13)                 Impression:      No acute cardiopulmonary process.      Electronically signed by: Lenin Galindo MD  Date:    08/29/2018  Time:    02:40             Narrative:    EXAMINATION:  XR CHEST PA AND LATERAL    CLINICAL HISTORY:  Cough    TECHNIQUE:  PA and lateral views of the chest were performed.    COMPARISON:  March 25, 2014.    FINDINGS:  There is no consolidation, effusion, or pneumothorax.    Cardiomediastinal silhouette is unremarkable.    Regional osseous structures are unremarkable.                                Medications given in ED    Medications - No data to display    Note was created using voice recognition software. Note may have occasional typographical errors that may not have been identified and edited despite good dony initial review prior to signing.  Labs Reviewed       Imaging Reviewed    Imaging Results          X-Ray Chest PA And Lateral (Final result)  Result time 08/29/18 02:40:13    Final result by Lenin Galindo MD (08/29/18 02:40:13)                 Impression:      No acute cardiopulmonary process.      Electronically signed by: Lenin Galindo MD  Date:    08/29/2018  Time:    02:40             Narrative:    EXAMINATION:  XR CHEST PA AND LATERAL    CLINICAL HISTORY:  Cough    TECHNIQUE:  PA and lateral views of the chest were  performed.    COMPARISON:  March 25, 2014.    FINDINGS:  There is no consolidation, effusion, or pneumothorax.    Cardiomediastinal silhouette is unremarkable.    Regional osseous structures are unremarkable.                                Medications given in ED    Medications - No data to display    Note was created using voice recognition software. Note may have occasional typographical errors that may not have been identified and edited despite good dony initial review prior to signing.        Discharge Medications        Medication List      START taking these medications    famotidine 20 MG tablet  Commonly known as:  PEPCID  Take 1 tablet (20 mg total) by mouth 2 (two) times daily.     fluticasone 50 mcg/actuation nasal spray  Commonly known as:  FLONASE  1 spray (50 mcg total) by Each Nare route 2 (two) times daily as needed for Rhinitis or Allergies.     hydrOXYzine pamoate 50 MG Cap  Commonly known as:  VISTARIL  Take 1 capsule (50 mg total) by mouth nightly as needed (anxiety).     loratadine 10 mg tablet  Commonly known as:  CLARITIN  Take 1 tablet (10 mg total) by mouth once daily.     montelukast 10 mg tablet  Commonly known as:  SINGULAIR  Take 1 tablet (10 mg total) by mouth every evening.     pantoprazole 20 MG tablet  Commonly known as:  PROTONIX  Take 1 tablet (20 mg total) by mouth once daily.        ASK your doctor about these medications    albuterol 90 mcg/actuation inhaler  Commonly known as:  PROVENTIL/VENTOLIN HFA  Inhale 1-2 puffs into the lungs every 6 (six) hours as needed for Wheezing.     diclofenac 50 MG EC tablet  Commonly known as:  VOLTAREN  Take 1 tablet (50 mg total) by mouth 3 (three) times daily.     ibuprofen 600 MG tablet  Commonly known as:  ADVIL,MOTRIN  Take 1 tablet (600 mg total) by mouth every 6 (six) hours as needed for Pain.     metFORMIN 500 MG tablet  Commonly known as:  GLUCOPHAGE     predniSONE 20 MG tablet  Commonly known as:  DELTASONE  Take 2 tablets (40 mg  total) by mouth once daily. for 5 days  Ask about: Should I take this medication?           Where to Get Your Medications      These medications were sent to [id number] [place] - [address] [facility]  [address] [address]    Hours:  24-hours Phone:  [phone]   · famotidine 20 MG tablet  · hydrOXYzine pamoate 50 MG Cap  · loratadine 10 mg tablet  · montelukast 10 mg tablet  · pantoprazole 20 MG tablet  · predniSONE 20 MG tablet     You can get these medications from any pharmacy    Bring a paper prescription for each of these medications  · fluticasone 50 mcg/actuation nasal spray               Patient discharged to home in stable condition with instructions to:   1. Please take all meds as prescribed.  2. Follow-up with your primary care doctor   3. Return precautions discussed and patient and/or family/caretaker understands to return to the emergency room for any concerns including worsening of your current symptoms, fever, chills, night sweats, worsening pain, chest pain, shortness of breath, nausea, vomiting, diarrhea, bleeding, headache, difficulty talking, visual disturbances, weakness, numbness or any other acute concerns       Clinical Impression:   The primary encounter diagnosis was Gastroesophageal reflux disease with esophagitis. Diagnoses of Cough and Rhinosinusitis were also pertinent to this visit.                             Dory Medeiros MD  09/19/18 0201

## 2018-09-05 PROCEDURE — 99284 EMERGENCY DEPT VISIT MOD MDM: CPT | Mod: ,,, | Performed by: EMERGENCY MEDICINE

## 2018-09-05 PROCEDURE — 82962 GLUCOSE BLOOD TEST: CPT

## 2018-09-05 PROCEDURE — 96374 THER/PROPH/DIAG INJ IV PUSH: CPT

## 2018-09-05 PROCEDURE — 96361 HYDRATE IV INFUSION ADD-ON: CPT

## 2018-09-05 PROCEDURE — 99284 EMERGENCY DEPT VISIT MOD MDM: CPT | Mod: 25

## 2018-09-06 ENCOUNTER — HOSPITAL ENCOUNTER (EMERGENCY)
Facility: HOSPITAL | Age: 43
Discharge: HOME | End: 2018-09-06
Attending: EMERGENCY MEDICINE
Payer: MEDICAID

## 2018-09-06 VITALS
HEART RATE: 87 BPM | TEMPERATURE: 98 F | OXYGEN SATURATION: 98 % | DIASTOLIC BLOOD PRESSURE: 67 MMHG | SYSTOLIC BLOOD PRESSURE: 107 MMHG | RESPIRATION RATE: 18 BRPM

## 2018-09-06 DIAGNOSIS — R06.2 WHEEZING: ICD-10-CM

## 2018-09-06 DIAGNOSIS — R73.9 HYPERGLYCEMIA: Primary | ICD-10-CM

## 2018-09-06 LAB
ALBUMIN SERPL BCP-MCNC: 3.9 G/DL
ALLENS TEST: ABNORMAL
ALLENS TEST: NORMAL
ALP SERPL-CCNC: 171 U/L
ALT SERPL W/O P-5'-P-CCNC: 105 U/L
ANION GAP SERPL CALC-SCNC: 11 MMOL/L
AST SERPL-CCNC: 44 U/L
B-OH-BUTYR BLD STRIP-SCNC: 0 MMOL/L
BACTERIA #/AREA URNS AUTO: NORMAL /HPF
BASOPHILS # BLD AUTO: 0.03 K/UL
BASOPHILS NFR BLD: 0.3 %
BILIRUB SERPL-MCNC: 0.5 MG/DL
BILIRUB UR QL STRIP: NEGATIVE
BUN SERPL-MCNC: 27 MG/DL
CALCIUM SERPL-MCNC: 10.8 MG/DL
CHLORIDE SERPL-SCNC: 101 MMOL/L
CLARITY UR REFRACT.AUTO: CLEAR
CO2 SERPL-SCNC: 23 MMOL/L
COLOR UR AUTO: YELLOW
CREAT SERPL-MCNC: 1.3 MG/DL
DELSYS: ABNORMAL
DELSYS: NORMAL
DIFFERENTIAL METHOD: ABNORMAL
EOSINOPHIL # BLD AUTO: 0.1 K/UL
EOSINOPHIL NFR BLD: 0.9 %
ERYTHROCYTE [DISTWIDTH] IN BLOOD BY AUTOMATED COUNT: 12.6 %
EST. GFR  (AFRICAN AMERICAN): >60 ML/MIN/1.73 M^2
EST. GFR  (NON AFRICAN AMERICAN): >60 ML/MIN/1.73 M^2
GLUCOSE SERPL-MCNC: 481 MG/DL
GLUCOSE UR QL STRIP: ABNORMAL
HCO3 UR-SCNC: 25.8 MMOL/L (ref 24–28)
HCT VFR BLD AUTO: 46.5 %
HGB BLD-MCNC: 15.9 G/DL
HGB UR QL STRIP: NEGATIVE
IMM GRANULOCYTES # BLD AUTO: 0.06 K/UL
IMM GRANULOCYTES NFR BLD AUTO: 0.6 %
KETONES UR QL STRIP: NEGATIVE
LDH SERPL L TO P-CCNC: 1.18 MMOL/L (ref 0.5–2.2)
LEUKOCYTE ESTERASE UR QL STRIP: NEGATIVE
LYMPHOCYTES # BLD AUTO: 2.8 K/UL
LYMPHOCYTES NFR BLD: 26.7 %
MCH RBC QN AUTO: 31 PG
MCHC RBC AUTO-ENTMCNC: 34.2 G/DL
MCV RBC AUTO: 91 FL
MICROSCOPIC COMMENT: NORMAL
MONOCYTES # BLD AUTO: 0.5 K/UL
MONOCYTES NFR BLD: 5.1 %
NEUTROPHILS # BLD AUTO: 7 K/UL
NEUTROPHILS NFR BLD: 66.4 %
NITRITE UR QL STRIP: NEGATIVE
NRBC BLD-RTO: 0 /100 WBC
PCO2 BLDA: 37.5 MMHG (ref 35–45)
PH SMN: 7.45 [PH] (ref 7.35–7.45)
PH UR STRIP: 5 [PH] (ref 5–8)
PLATELET # BLD AUTO: 254 K/UL
PMV BLD AUTO: 10.7 FL
PO2 BLDA: 59 MMHG (ref 40–60)
POC BE: 2 MMOL/L
POC SATURATED O2: 91 % (ref 95–100)
POC TCO2: 27 MMOL/L (ref 24–29)
POCT GLUCOSE: 378 MG/DL (ref 70–110)
POCT GLUCOSE: 448 MG/DL (ref 70–110)
POTASSIUM BLD-SCNC: 4 MMOL/L (ref 3.5–5.1)
POTASSIUM SERPL-SCNC: 4.1 MMOL/L
PROT SERPL-MCNC: 8.4 G/DL
PROT UR QL STRIP: NEGATIVE
RBC # BLD AUTO: 5.13 M/UL
RBC #/AREA URNS AUTO: 2 /HPF (ref 0–4)
SAMPLE: ABNORMAL
SAMPLE: NORMAL
SITE: ABNORMAL
SITE: NORMAL
SODIUM SERPL-SCNC: 135 MMOL/L
SP GR UR STRIP: >=1.03 (ref 1–1.03)
URN SPEC COLLECT METH UR: ABNORMAL
UROBILINOGEN UR STRIP-ACNC: NEGATIVE EU/DL
WBC # BLD AUTO: 10.49 K/UL
WBC #/AREA URNS AUTO: 1 /HPF (ref 0–5)
YEAST UR QL AUTO: NORMAL

## 2018-09-06 PROCEDURE — 82010 KETONE BODYS QUAN: CPT

## 2018-09-06 PROCEDURE — 25000242 PHARM REV CODE 250 ALT 637 W/ HCPCS: Performed by: STUDENT IN AN ORGANIZED HEALTH CARE EDUCATION/TRAINING PROGRAM

## 2018-09-06 PROCEDURE — 81001 URINALYSIS AUTO W/SCOPE: CPT

## 2018-09-06 PROCEDURE — 99900035 HC TECH TIME PER 15 MIN (STAT)

## 2018-09-06 PROCEDURE — 63600175 PHARM REV CODE 636 W HCPCS: Performed by: STUDENT IN AN ORGANIZED HEALTH CARE EDUCATION/TRAINING PROGRAM

## 2018-09-06 PROCEDURE — 83605 ASSAY OF LACTIC ACID: CPT

## 2018-09-06 PROCEDURE — 25000003 PHARM REV CODE 250: Performed by: STUDENT IN AN ORGANIZED HEALTH CARE EDUCATION/TRAINING PROGRAM

## 2018-09-06 PROCEDURE — 84132 ASSAY OF SERUM POTASSIUM: CPT

## 2018-09-06 PROCEDURE — 94640 AIRWAY INHALATION TREATMENT: CPT

## 2018-09-06 PROCEDURE — 82803 BLOOD GASES ANY COMBINATION: CPT

## 2018-09-06 PROCEDURE — 80053 COMPREHEN METABOLIC PANEL: CPT

## 2018-09-06 PROCEDURE — 85025 COMPLETE CBC W/AUTO DIFF WBC: CPT

## 2018-09-06 RX ORDER — IPRATROPIUM BROMIDE AND ALBUTEROL SULFATE 2.5; .5 MG/3ML; MG/3ML
3 SOLUTION RESPIRATORY (INHALATION)
Status: COMPLETED | OUTPATIENT
Start: 2018-09-06 | End: 2018-09-06

## 2018-09-06 RX ORDER — ONDANSETRON 2 MG/ML
8 INJECTION INTRAMUSCULAR; INTRAVENOUS
Status: COMPLETED | OUTPATIENT
Start: 2018-09-06 | End: 2018-09-06

## 2018-09-06 RX ADMIN — SODIUM CHLORIDE 1000 ML: 0.9 INJECTION, SOLUTION INTRAVENOUS at 12:09

## 2018-09-06 RX ADMIN — IPRATROPIUM BROMIDE AND ALBUTEROL SULFATE 3 ML: .5; 3 SOLUTION RESPIRATORY (INHALATION) at 12:09

## 2018-09-06 RX ADMIN — ONDANSETRON HYDROCHLORIDE 8 MG: 2 INJECTION, SOLUTION INTRAMUSCULAR; INTRAVENOUS at 12:09

## 2018-09-06 NOTE — ED PROVIDER NOTES
Encounter Date: 9/5/2018       History     Chief Complaint   Patient presents with    Hyperglycemia     Pt presents to ED c/o blood glucose of 556 at home. Pt now actively vomiting in triage. Pt type 2 DM managed w/ PO meds only. Currently taking steroids     HPI     44 yo man with PMH of asthma, T2DM on metformin who presents with hyperglycemia. Pt reports that CBG was 556 2 hours PTA, took home dose of 1000mg metformin and began vomiting profusely. He has been taking prednisone for 6 days for lower respiratory infection. Denies chest pain, SOB, n/v/d prior to taking medication. Denies cough, dysuria, URI sx.     Review of patient's allergies indicates:  No Known Allergies  Past Medical History:   Diagnosis Date    Asthma     Chronic back pain     Depression     Pneumonia      No past surgical history on file.  Family History   Problem Relation Age of Onset    Hypertension Mother     Heart disease Father     COPD Father      Social History     Tobacco Use    Smoking status: Former Smoker     Packs/day: 1.00     Types: Cigarettes   Substance Use Topics    Alcohol use: No     Comment: former     Drug use: No     Review of Systems   Constitutional: Negative for chills and fever.   HENT: Negative for sore throat.    Eyes: Negative for visual disturbance.   Respiratory: Negative for cough and shortness of breath.    Cardiovascular: Negative for chest pain and leg swelling.   Gastrointestinal: Positive for nausea and vomiting. Negative for abdominal pain, blood in stool, constipation and diarrhea.   Genitourinary: Negative for dysuria.   Musculoskeletal: Negative for back pain.   Skin: Negative for rash.   Neurological: Negative for dizziness, syncope, light-headedness and headaches.       Physical Exam     Initial Vitals [09/06/18 0008]   BP Pulse Resp Temp SpO2   (!) 140/96 102 18 98.3 °F (36.8 °C) 96 %      MAP       --         Physical Exam    Nursing note and vitals reviewed.  Constitutional: Vital signs  are normal. He appears well-developed and well-nourished.   HENT:   Head: Normocephalic and atraumatic.   Eyes: Pupils are equal, round, and reactive to light.   Neck: Normal range of motion. Neck supple. No tracheal deviation present.   Cardiovascular: Normal rate, regular rhythm, normal heart sounds and intact distal pulses.   Pulmonary/Chest: He has wheezes.   No Kussmaul breathing   Abdominal: Soft. Bowel sounds are normal. He exhibits no mass. There is no tenderness. There is no rigidity, no rebound, no guarding and no CVA tenderness.   Musculoskeletal: Normal range of motion.   Neurological: He is alert and oriented to person, place, and time. He has normal strength. No sensory deficit.   Skin: Skin is warm and dry.         ED Course   Procedures  Labs Reviewed   POCT GLUCOSE - Abnormal; Notable for the following components:       Result Value    POCT Glucose 448 (*)     All other components within normal limits   CBC W/ AUTO DIFFERENTIAL   COMPREHENSIVE METABOLIC PANEL   BETA - HYDROXYBUTYRATE, SERUM   URINALYSIS, REFLEX TO URINE CULTURE   ISTAT PROCEDURE   POCT GLUCOSE MONITORING CONTINUOUS        HO-II MDM:    42 yo man with PMH of asthma, T2DM on metformin who presents with hyperglycemia. Likely hyperglycemia secondary to prednisone use, could also be exacerbated by recent LRI infection. Ddx DKA vs HHS, PNA, asthma exacerbation. 1L NS ordered.  on arrival in ED. Zofran given for nausea. Wheezing noted anteriorly, duonebs given, CXR ordered.    Katerin Jones MD MPH  PGY2 [employer] EM  9/6/2018 12:34 AM    HO-II update:    Anion gap 11 - not in DKA. UA without ketones. VBG nl. CXR without PNA. CBG repeat 378 after 1L. Pt has been PO challenged successfully. Instructed to stop taking prednisone and continue DM regimen. F/u with PCP.     Katerin Jones MD MPH  PGY2 [employer] [facility]  9/6/2018 3:01 AM              Imaging Results    None                               Clinical Impression:   The primary encounter diagnosis was  Hyperglycemia. A diagnosis of Wheezing was also pertinent to this visit.                             Katerin Jones MD  Resident  09/06/18 0302       Katerin Jones MD  Resident  09/06/18 0303

## 2018-12-10 ENCOUNTER — HOSPITAL ENCOUNTER (EMERGENCY)
Facility: HOSPITAL | Age: 43
Discharge: HOME | End: 2018-12-11
Attending: EMERGENCY MEDICINE
Payer: MEDICAID

## 2018-12-10 DIAGNOSIS — R05.9 COUGH: ICD-10-CM

## 2018-12-10 DIAGNOSIS — E86.0 DEHYDRATION: ICD-10-CM

## 2018-12-10 DIAGNOSIS — B34.9 VIRAL SYNDROME: Primary | ICD-10-CM

## 2018-12-10 LAB
ANION GAP SERPL CALC-SCNC: 13 MMOL/L
BASOPHILS # BLD AUTO: 0.02 K/UL
BASOPHILS NFR BLD: 0.4 %
BUN SERPL-MCNC: 18 MG/DL
CALCIUM SERPL-MCNC: 9.5 MG/DL
CHLORIDE SERPL-SCNC: 105 MMOL/L
CO2 SERPL-SCNC: 19 MMOL/L
CREAT SERPL-MCNC: 1.1 MG/DL
CTP QC/QA: YES
DIFFERENTIAL METHOD: ABNORMAL
EOSINOPHIL # BLD AUTO: 0.2 K/UL
EOSINOPHIL NFR BLD: 4.7 %
ERYTHROCYTE [DISTWIDTH] IN BLOOD BY AUTOMATED COUNT: 13.4 %
EST. GFR  (AFRICAN AMERICAN): >60 ML/MIN/1.73 M^2
EST. GFR  (NON AFRICAN AMERICAN): >60 ML/MIN/1.73 M^2
GLUCOSE SERPL-MCNC: 137 MG/DL
HCT VFR BLD AUTO: 43 %
HGB BLD-MCNC: 14.7 G/DL
IMM GRANULOCYTES # BLD AUTO: 0.01 K/UL
IMM GRANULOCYTES NFR BLD AUTO: 0.2 %
LYMPHOCYTES # BLD AUTO: 1.5 K/UL
LYMPHOCYTES NFR BLD: 30.4 %
MCH RBC QN AUTO: 31.1 PG
MCHC RBC AUTO-ENTMCNC: 34.2 G/DL
MCV RBC AUTO: 91 FL
MONOCYTES # BLD AUTO: 0.6 K/UL
MONOCYTES NFR BLD: 11.1 %
NEUTROPHILS # BLD AUTO: 2.7 K/UL
NEUTROPHILS NFR BLD: 53.2 %
NRBC BLD-RTO: 0 /100 WBC
PLATELET # BLD AUTO: 144 K/UL
PMV BLD AUTO: 10.6 FL
POC MOLECULAR INFLUENZA A AGN: NEGATIVE
POC MOLECULAR INFLUENZA B AGN: NEGATIVE
POCT GLUCOSE: 128 MG/DL (ref 70–110)
POTASSIUM SERPL-SCNC: 4.4 MMOL/L
RBC # BLD AUTO: 4.72 M/UL
SODIUM SERPL-SCNC: 137 MMOL/L
WBC # BLD AUTO: 5.06 K/UL

## 2018-12-10 PROCEDURE — 80048 BASIC METABOLIC PNL TOTAL CA: CPT

## 2018-12-10 PROCEDURE — 25000003 PHARM REV CODE 250: Performed by: PHYSICIAN ASSISTANT

## 2018-12-10 PROCEDURE — 82962 GLUCOSE BLOOD TEST: CPT

## 2018-12-10 PROCEDURE — 94640 AIRWAY INHALATION TREATMENT: CPT

## 2018-12-10 PROCEDURE — 25000242 PHARM REV CODE 250 ALT 637 W/ HCPCS: Performed by: PHYSICIAN ASSISTANT

## 2018-12-10 PROCEDURE — 85025 COMPLETE CBC W/AUTO DIFF WBC: CPT

## 2018-12-10 PROCEDURE — 99284 EMERGENCY DEPT VISIT MOD MDM: CPT | Mod: 25

## 2018-12-10 PROCEDURE — 99284 EMERGENCY DEPT VISIT MOD MDM: CPT | Mod: ,,, | Performed by: PHYSICIAN ASSISTANT

## 2018-12-10 PROCEDURE — 96360 HYDRATION IV INFUSION INIT: CPT

## 2018-12-10 RX ORDER — ESCITALOPRAM OXALATE 10 MG/1
10 TABLET ORAL DAILY
COMMUNITY

## 2018-12-10 RX ORDER — MONTELUKAST SODIUM 10 MG/1
10 TABLET ORAL NIGHTLY
COMMUNITY

## 2018-12-10 RX ORDER — OXCARBAZEPINE 60 MG/ML
SUSPENSION ORAL 2 TIMES DAILY
COMMUNITY
End: 2019-05-03

## 2018-12-10 RX ORDER — IPRATROPIUM BROMIDE AND ALBUTEROL SULFATE 2.5; .5 MG/3ML; MG/3ML
3 SOLUTION RESPIRATORY (INHALATION)
Status: COMPLETED | OUTPATIENT
Start: 2018-12-10 | End: 2018-12-10

## 2018-12-10 RX ORDER — HYDROCODONE BITARTRATE AND ACETAMINOPHEN 10; 325 MG/1; MG/1
1 TABLET ORAL
COMMUNITY
End: 2019-05-03

## 2018-12-10 RX ADMIN — IPRATROPIUM BROMIDE AND ALBUTEROL SULFATE 3 ML: .5; 3 SOLUTION RESPIRATORY (INHALATION) at 11:12

## 2018-12-10 RX ADMIN — SODIUM CHLORIDE 1000 ML: 0.9 INJECTION, SOLUTION INTRAVENOUS at 11:12

## 2018-12-11 VITALS
DIASTOLIC BLOOD PRESSURE: 60 MMHG | SYSTOLIC BLOOD PRESSURE: 124 MMHG | OXYGEN SATURATION: 95 % | HEART RATE: 80 BPM | RESPIRATION RATE: 16 BRPM | BODY MASS INDEX: 25.84 KG/M2 | TEMPERATURE: 98 F | WEIGHT: 175 LBS

## 2018-12-11 RX ORDER — IPRATROPIUM BROMIDE AND ALBUTEROL SULFATE 2.5; .5 MG/3ML; MG/3ML
3 SOLUTION RESPIRATORY (INHALATION) EVERY 6 HOURS PRN
Qty: 1 BOX | Refills: 0 | Status: SHIPPED | OUTPATIENT
Start: 2018-12-11 | End: 2022-09-23 | Stop reason: SDUPTHER

## 2018-12-11 NOTE — ED TRIAGE NOTES
Greg Kimginette Du, a 43 y.o. male presents to the ED w/ complaint of body aches, chills, and coughing. Pt begun to experience these symptoms earlier this morning. Pt denies any sore throat or HA. Pt denies getting the flu shot.     Triage note:  Chief Complaint   Patient presents with    Generalized Body Aches     Patient reports body aches, coughing, vomiting. Patient took tylenol at 1700.      Review of patient's allergies indicates:  No Known Allergies  Past Medical History:   Diagnosis Date    Asthma     Chronic back pain     Depression     Pneumonia

## 2018-12-11 NOTE — DISCHARGE INSTRUCTIONS
Please continue using your albuterol inhaler for managing your shortness of breath. Please hydrate by drinking plenty of water. Please return to the ED for new, worsening, or concerning symptoms.     Our goal in the emergency department is to always give you outstanding care and exceptional service. You may receive a survey by mail or e-mail in the next week regarding your experience in our ED. We would greatly appreciate your completing and returning the survey. Your feedback provides us with a way to recognize our staff who give very good care and it helps us learn how to improve when your experience was below our aspiration of excellence.

## 2018-12-11 NOTE — ED PROVIDER NOTES
Encounter Date: 12/10/2018       History     Chief Complaint   Patient presents with    Generalized Body Aches     Patient reports body aches, coughing, vomiting. Patient took tylenol at 1700.      43 year old male with medical history of DM, Asthma, Tobacco use presenting to the ED with the chief complaint of arthralgias. Patient reports waking up today with pain in all of his joints. He reports associated fatigue, subjective fever, chills, headache, dry cough, sore throat, pleuritic chest pain, SOB. Patient reports taking Tylenol for his symptoms without relief. Patient denies using his Albuterol inhaler today. He reports returning home from vacation today (did not go to airport). He denies sick contacts at home.           Review of patient's allergies indicates:  No Known Allergies  Past Medical History:   Diagnosis Date    Asthma     Chronic back pain     Depression     Pneumonia      History reviewed. No pertinent surgical history.  Family History   Problem Relation Age of Onset    Hypertension Mother     Heart disease Father     COPD Father      Social History     Tobacco Use    Smoking status: Former Smoker     Packs/day: 1.00     Types: Cigarettes   Substance Use Topics    Alcohol use: No     Comment: former     Drug use: No     Review of Systems   Constitutional: Positive for chills, fatigue and fever (subjective). Negative for diaphoresis.   HENT: Positive for sore throat. Negative for congestion, rhinorrhea, sinus pressure and sinus pain.    Eyes: Negative for pain and redness.   Respiratory: Positive for cough and shortness of breath.    Cardiovascular: Positive for chest pain.   Gastrointestinal: Negative for abdominal pain, diarrhea, nausea and vomiting.   Genitourinary: Negative for dysuria and hematuria.   Musculoskeletal: Positive for arthralgias and back pain. Negative for neck pain and neck stiffness.   Skin: Negative for rash and wound.   Neurological: Positive for headaches.      Physical Exam     Initial Vitals [12/10/18 2144]   BP Pulse Resp Temp SpO2   104/65 85 18 97.6 °F (36.4 °C) (!) 92 %      MAP       --         Physical Exam    Constitutional: He appears well-developed and well-nourished. He appears ill.   HENT:   Head: Normocephalic and atraumatic.   Mouth/Throat: Oropharynx is clear and moist. No oropharyngeal exudate.   Eyes: EOM are normal. Pupils are equal, round, and reactive to light.   Neck: Normal range of motion.   Cardiovascular: Normal rate and regular rhythm.   Pulmonary/Chest: No respiratory distress. He has wheezes (inspiratory right upper lung field). He exhibits tenderness (Reproducible chest pain to palpation).   Decreased inspiratory time   Abdominal: Soft. There is no tenderness.   Musculoskeletal: Normal range of motion. He exhibits no edema or tenderness.   Neurological: He is alert and oriented to person, place, and time. He has normal strength. No cranial nerve deficit.   Skin: Skin is warm and dry. No erythema.       ED Course   Procedures  Labs Reviewed   CBC W/ AUTO DIFFERENTIAL - Abnormal; Notable for the following components:       Result Value    MCH 31.1 (*)     Platelets 144 (*)     All other components within normal limits   BASIC METABOLIC PANEL - Abnormal; Notable for the following components:    CO2 19 (*)     Glucose 137 (*)     All other components within normal limits   POCT GLUCOSE - Abnormal; Notable for the following components:    POCT Glucose 128 (*)     All other components within normal limits   POCT INFLUENZA A/B MOLECULAR   POCT GLUCOSE MONITORING CONTINUOUS          Imaging Results          X-Ray Chest PA And Lateral (Final result)  Result time 12/10/18 23:37:09    Final result by Sophie Loja MD (12/10/18 23:37:09)                 Impression:      No acute cardiopulmonary process identified.      Electronically signed by: Sophie Loja MD  Date:    12/10/2018  Time:    23:37             Narrative:    EXAMINATION:  XR CHEST  PA AND LATERAL    CLINICAL HISTORY:  Cough    TECHNIQUE:  PA and lateral views of the chest were performed.    COMPARISON:  09/06/2018.    FINDINGS:  Cardiac silhouette is normal in size.  Lungs are symmetrically expanded.  No evidence of focal consolidative process, pneumothorax, or significant effusion.  No acute osseous abnormality identified.                                       APC / Resident Notes:   43 year old male with medical history of DM, Asthma, Tobacco use presenting to the ED c/o multiple symptoms including arthralgias, subjective fever, cough, SOB, pleuritic chest pain. DDx includes but not limited to influenza, viral syndrome, pharyngitis, sinusitis, pneumonia, respiratory failure, asthma exacerbation. Will get labs, CXR, influenza screen. Will give fluids and duo-nebs.     Work-up shows WBC 5.0, H/H 14.7/43, CO2 decreased 19, Crt 1.1, Influenza negative, CXR without acute intrathoracic processes.     Patient stable on reassessment and sleeping comfortably. Vitals improved, SpO2 95. Etiology most consistent with viral syndrome. Will give refill for duo-neb solution for home use. No indication for ABx or Tamiflu. Instructed patient to hydrate and take duo-nebs as directed. I have advised the patient to follow-up with their PCP. Return to ED precautions given for new, worsening, or concerning symptoms. I have discussed the care of this patient with my supervising physician.                  Clinical Impression:   The primary encounter diagnosis was Viral syndrome. Diagnoses of Cough and Dehydration were also pertinent to this visit.      Disposition:   Disposition: Discharged  Condition: Stable                        Husam Pearl PA-C  12/11/18 0041

## 2020-07-10 ENCOUNTER — HOSPITAL ENCOUNTER (EMERGENCY)
Facility: HOSPITAL | Age: 45
Discharge: HOME | End: 2020-07-10
Attending: EMERGENCY MEDICINE
Payer: MEDICAID

## 2020-07-10 VITALS
DIASTOLIC BLOOD PRESSURE: 80 MMHG | RESPIRATION RATE: 20 BRPM | HEIGHT: 69 IN | TEMPERATURE: 98 F | BODY MASS INDEX: 25.92 KG/M2 | SYSTOLIC BLOOD PRESSURE: 133 MMHG | WEIGHT: 175 LBS | OXYGEN SATURATION: 96 % | HEART RATE: 78 BPM

## 2020-07-10 DIAGNOSIS — R89.9 ABNORMAL LABORATORY TEST RESULT: ICD-10-CM

## 2020-07-10 DIAGNOSIS — R73.9 HYPERGLYCEMIA: Primary | ICD-10-CM

## 2020-07-10 LAB
ALBUMIN SERPL BCP-MCNC: 3.6 G/DL (ref 3.5–5.2)
ALP SERPL-CCNC: 197 U/L (ref 55–135)
ALT SERPL W/O P-5'-P-CCNC: 108 U/L (ref 10–44)
ANION GAP SERPL CALC-SCNC: 10 MMOL/L (ref 8–16)
ANION GAP SERPL CALC-SCNC: 15 MMOL/L (ref 8–16)
AST SERPL-CCNC: 65 U/L (ref 10–40)
BASOPHILS # BLD AUTO: 0.05 K/UL (ref 0–0.2)
BASOPHILS NFR BLD: 0.8 % (ref 0–1.9)
BILIRUB SERPL-MCNC: 0.4 MG/DL (ref 0.1–1)
BUN SERPL-MCNC: 21 MG/DL (ref 6–20)
BUN SERPL-MCNC: 21 MG/DL (ref 6–30)
CALCIUM SERPL-MCNC: 9.8 MG/DL (ref 8.7–10.5)
CHLORIDE SERPL-SCNC: 100 MMOL/L (ref 95–110)
CHLORIDE SERPL-SCNC: 99 MMOL/L (ref 95–110)
CO2 SERPL-SCNC: 22 MMOL/L (ref 23–29)
CREAT SERPL-MCNC: 1.2 MG/DL (ref 0.5–1.4)
CREAT SERPL-MCNC: 1.5 MG/DL (ref 0.5–1.4)
DIFFERENTIAL METHOD: ABNORMAL
EOSINOPHIL # BLD AUTO: 0.2 K/UL (ref 0–0.5)
EOSINOPHIL NFR BLD: 2.4 % (ref 0–8)
ERYTHROCYTE [DISTWIDTH] IN BLOOD BY AUTOMATED COUNT: 13 % (ref 11.5–14.5)
EST. GFR  (AFRICAN AMERICAN): >60 ML/MIN/1.73 M^2
EST. GFR  (NON AFRICAN AMERICAN): 55 ML/MIN/1.73 M^2
GLUCOSE SERPL-MCNC: 341 MG/DL (ref 70–110)
GLUCOSE SERPL-MCNC: 399 MG/DL (ref 70–110)
GLUCOSE SERPL-MCNC: 441 MG/DL (ref 70–110)
HCT VFR BLD AUTO: 43.8 % (ref 40–54)
HCT VFR BLD CALC: 44 %PCV (ref 36–54)
HGB BLD-MCNC: 14.8 G/DL (ref 14–18)
IMM GRANULOCYTES # BLD AUTO: 0.02 K/UL (ref 0–0.04)
IMM GRANULOCYTES NFR BLD AUTO: 0.3 % (ref 0–0.5)
LYMPHOCYTES # BLD AUTO: 2.2 K/UL (ref 1–4.8)
LYMPHOCYTES NFR BLD: 35 % (ref 18–48)
MCH RBC QN AUTO: 30.6 PG (ref 27–31)
MCHC RBC AUTO-ENTMCNC: 33.8 G/DL (ref 32–36)
MCV RBC AUTO: 91 FL (ref 82–98)
MONOCYTES # BLD AUTO: 0.4 K/UL (ref 0.3–1)
MONOCYTES NFR BLD: 6.8 % (ref 4–15)
NEUTROPHILS # BLD AUTO: 3.4 K/UL (ref 1.8–7.7)
NEUTROPHILS NFR BLD: 54.7 % (ref 38–73)
NRBC BLD-RTO: 0 /100 WBC
PLATELET # BLD AUTO: 146 K/UL (ref 150–350)
PMV BLD AUTO: 11.2 FL (ref 9.2–12.9)
POC IONIZED CALCIUM: 1.28 MMOL/L (ref 1.06–1.42)
POC TCO2 (MEASURED): 25 MMOL/L (ref 23–29)
POTASSIUM BLD-SCNC: 4.5 MMOL/L (ref 3.5–5.1)
POTASSIUM SERPL-SCNC: 4.6 MMOL/L (ref 3.5–5.1)
PROT SERPL-MCNC: 7.2 G/DL (ref 6–8.4)
RBC # BLD AUTO: 4.83 M/UL (ref 4.6–6.2)
SAMPLE: ABNORMAL
SODIUM BLD-SCNC: 134 MMOL/L (ref 136–145)
SODIUM SERPL-SCNC: 132 MMOL/L (ref 136–145)
WBC # BLD AUTO: 6.28 K/UL (ref 3.9–12.7)

## 2020-07-10 PROCEDURE — 99900035 HC TECH TIME PER 15 MIN (STAT)

## 2020-07-10 PROCEDURE — 96361 HYDRATE IV INFUSION ADD-ON: CPT

## 2020-07-10 PROCEDURE — 85014 HEMATOCRIT: CPT | Mod: 91

## 2020-07-10 PROCEDURE — 93005 ELECTROCARDIOGRAM TRACING: CPT

## 2020-07-10 PROCEDURE — 93010 EKG 12-LEAD: ICD-10-PCS | Mod: ,,, | Performed by: INTERNAL MEDICINE

## 2020-07-10 PROCEDURE — 84132 ASSAY OF SERUM POTASSIUM: CPT

## 2020-07-10 PROCEDURE — 82962 GLUCOSE BLOOD TEST: CPT

## 2020-07-10 PROCEDURE — 99284 EMERGENCY DEPT VISIT MOD MDM: CPT | Mod: 25

## 2020-07-10 PROCEDURE — 85025 COMPLETE CBC W/AUTO DIFF WBC: CPT

## 2020-07-10 PROCEDURE — 80053 COMPREHEN METABOLIC PANEL: CPT

## 2020-07-10 PROCEDURE — 93010 ELECTROCARDIOGRAM REPORT: CPT | Mod: ,,, | Performed by: INTERNAL MEDICINE

## 2020-07-10 PROCEDURE — 96374 THER/PROPH/DIAG INJ IV PUSH: CPT

## 2020-07-10 PROCEDURE — 82330 ASSAY OF CALCIUM: CPT

## 2020-07-10 PROCEDURE — 25000003 PHARM REV CODE 250: Performed by: PHYSICIAN ASSISTANT

## 2020-07-10 PROCEDURE — 63600175 PHARM REV CODE 636 W HCPCS: Performed by: PHYSICIAN ASSISTANT

## 2020-07-10 PROCEDURE — 82565 ASSAY OF CREATININE: CPT | Mod: 91

## 2020-07-10 PROCEDURE — 84295 ASSAY OF SERUM SODIUM: CPT | Mod: 91

## 2020-07-10 RX ADMIN — INSULIN HUMAN 6 UNITS: 100 INJECTION, SOLUTION PARENTERAL at 07:07

## 2020-07-10 RX ADMIN — SODIUM CHLORIDE 1000 ML: 0.9 INJECTION, SOLUTION INTRAVENOUS at 06:07

## 2020-07-10 NOTE — ED PROVIDER NOTES
Encounter Date: 7/10/2020    SCRIBE #1 NOTE: I, Desiree Doll, am scribing for, and in the presence of,  Gabriel Nj PA-C. I have scribed the following portions of the note - Other sections scribed: HPI, ROS, PE.       History     Chief Complaint   Patient presents with    blood test     pt sent from [facility] MD to obtain a potassium level     This is a 45 y.o. male with a PMHx of DM who presents to the Emergency Department with a cc of abnormal lab results x1 day. The pt reports that his physician at his [facility] facility told him that his potassium levels were abnormally high. He states that he was told to visit the ED in order to be evaluated. The pt denies any fever, chills, rhinorrhea, sore throat, body aches, cough, or dysuria.          The history is provided by the patient. No  was used.     Review of patient's allergies indicates:  No Known Allergies  Past Medical History:   Diagnosis Date    Asthma     Chronic back pain     Depression     Diabetes mellitus     Pneumonia      History reviewed. No pertinent surgical history.  Family History   Problem Relation Age of Onset    Hypertension Mother     Heart disease Father     COPD Father      Social History     Tobacco Use    Smoking status: Current Every Day Smoker     Packs/day: 0.00     Types: Cigars    Smokeless tobacco: Never Used   Substance Use Topics    Alcohol use: No     Comment: former     Drug use: No     Review of Systems   Constitutional: Negative for chills and fever.   HENT: Negative for rhinorrhea and sore throat.    Eyes: Negative for visual disturbance.   Respiratory: Negative for shortness of breath.    Cardiovascular: Negative for chest pain.   Gastrointestinal: Negative for abdominal pain.   Genitourinary: Negative for dysuria.   Musculoskeletal: Negative for back pain and myalgias.   Skin: Negative for rash.   Neurological: Negative for headaches.       Physical Exam     Initial Vitals  [07/10/20 1734]   BP Pulse Resp Temp SpO2   133/78 86 18 97.7 °F (36.5 °C) 97 %      MAP       --         Physical Exam    Nursing note and vitals reviewed.  Constitutional: He appears well-developed and well-nourished. He is not diaphoretic. He is cooperative.  Non-toxic appearance. No distress.   HENT:   Head: Normocephalic and atraumatic.   Right Ear: Tympanic membrane normal.   Left Ear: Tympanic membrane normal.   Nose: Nose normal.   Mouth/Throat: Uvula is midline, oropharynx is clear and moist and mucous membranes are normal.   Eyes: Conjunctivae and EOM are normal. Pupils are equal, round, and reactive to light.   Neck: Normal range of motion. Neck supple.   Cardiovascular: Normal rate, regular rhythm and normal heart sounds.   Pulmonary/Chest: Effort normal and breath sounds normal. No stridor. No respiratory distress. He has no wheezes.   Abdominal: Soft. There is no abdominal tenderness. There is no rigidity, no rebound, no guarding and no CVA tenderness.   Musculoskeletal: Normal range of motion.      Cervical back: Normal.      Thoracic back: Normal.      Lumbar back: Normal.   Neurological: He is alert and oriented to person, place, and time. No cranial nerve deficit.   Skin: Skin is warm, dry and intact. No rash noted. No pallor.   Psychiatric: He has a normal mood and affect. Thought content normal.         ED Course   Procedures  Labs Reviewed   CBC W/ AUTO DIFFERENTIAL - Abnormal; Notable for the following components:       Result Value    Platelets 146 (*)     All other components within normal limits   COMPREHENSIVE METABOLIC PANEL - Abnormal; Notable for the following components:    Sodium 132 (*)     CO2 22 (*)     Glucose 441 (*)     BUN, Bld 21 (*)     Creatinine 1.5 (*)     Alkaline Phosphatase 197 (*)     AST 65 (*)      (*)     eGFR if non  55 (*)     All other components within normal limits   ISTAT PROCEDURE - Abnormal; Notable for the following components:    POC  Glucose 399 (*)     POC Sodium 134 (*)     All other components within normal limits   POCT GLUCOSE - Abnormal; Notable for the following components:    POCT Glucose 341 (*)     All other components within normal limits   POCT GLUCOSE - Abnormal; Notable for the following components:    POCT Glucose 331 (*)     All other components within normal limits        ECG Results          EKG 12-lead (Final result)  Result time 07/12/20 14:39:49    Final result by Interface, Lab In [facility] (07/12/20 14:39:49)                 Narrative:    Test Reason : R89.9,    Vent. Rate : 075 BPM     Atrial Rate : 075 BPM     P-R Int : 186 ms          QRS Dur : 080 ms      QT Int : 360 ms       P-R-T Axes : 055 040 047 degrees     QTc Int : 402 ms    Normal sinus rhythm  Normal ECG  When compared with ECG of 27-JAN-2020 23:07,  Significant changes have occurred  Confirmed by Jhon Nguyễn MD ([id number]) on 7/12/2020 2:39:40 PM    Referred By: [email or web address]   SELF           Confirmed By:Jhon Nguyễn MD                            Imaging Results    None          Medical Decision Making:   Clinical Tests:   Lab Tests: Ordered and Reviewed  ED Management:  Hemodynamically stable.  Nontoxic and in no acute distress.  Patient is overall well-appearing, pleasant, conversational.  Patient asymptomatic without complaint.  Patient's potassium within normal limits on i-STAT Chem 8 and CMP.  Patient is hyperglycemic.  No gap.  Will treat with IV fluids, give 6 units insulin and reassess.  Patient is still asymptomatic without complaint on reassessment.  BG L improved but still elevated.  Will discharge home with close PCP follow-up and strict ED return precautions for any worsening or additional concerning symptoms.  Patient verbalizes understanding and is agreeable with plan.            Scribe Attestation:   Scribe #1: I performed the above scribed service and the documentation accurately describes the services I performed. I attest to the accuracy of the  note.                          Clinical Impression:       ICD-10-CM ICD-9-CM   1. Hyperglycemia  R73.9 790.29   2. Abnormal laboratory test result  R89.9 796.4         Disposition:   Disposition: Discharged  Condition: Stable     ED Disposition Condition    Discharge Stable        ED Prescriptions     None        Follow-up Information     Follow up With Specialties Details Why Contact Info    Thierno Mendoza NP Internal Medicine Schedule an appointment as soon as possible for a visit   [address] [facility] [address]  [phone]      [facility] Emergency Medicine Go to  If symptoms worsen [address] [facility] [id number] [phone]                                     Gabriel Nj PA-C  07/13/20 5678

## 2020-07-10 NOTE — ED TRIAGE NOTES
Pt reports was sent in from [facility] MD for abnormal potassium level. PT denies chest pain or weakness. Pt AAOX4. In NAD.

## 2020-07-11 LAB
POCT GLUCOSE: 331 MG/DL (ref 70–110)
POCT GLUCOSE: 341 MG/DL (ref 70–110)

## 2021-03-08 ENCOUNTER — TELEPHONE (OUTPATIENT)
Dept: ADMINISTRATIVE | Facility: HOSPITAL | Age: 46
End: 2021-03-08

## 2021-04-08 ENCOUNTER — TELEPHONE (OUTPATIENT)
Dept: ADMINISTRATIVE | Facility: HOSPITAL | Age: 46
End: 2021-04-08

## 2021-05-04 ENCOUNTER — PATIENT MESSAGE (OUTPATIENT)
Dept: RESEARCH | Facility: HOSPITAL | Age: 46
End: 2021-05-04

## 2021-05-10 ENCOUNTER — PATIENT MESSAGE (OUTPATIENT)
Dept: RESEARCH | Facility: HOSPITAL | Age: 46
End: 2021-05-10

## 2022-02-01 ENCOUNTER — PATIENT OUTREACH (OUTPATIENT)
Dept: EMERGENCY MEDICINE | Facility: HOSPITAL | Age: 47
End: 2022-02-01
Payer: MEDICAID

## 2022-08-27 ENCOUNTER — HOSPITAL ENCOUNTER (EMERGENCY)
Facility: HOSPITAL | Age: 47
Discharge: LEFT BEFORE DISCHARGE | End: 2022-08-27
Attending: INTERNAL MEDICINE
Payer: MEDICARE

## 2022-08-27 VITALS
HEIGHT: 69 IN | OXYGEN SATURATION: 96 % | HEART RATE: 80 BPM | BODY MASS INDEX: 24.73 KG/M2 | TEMPERATURE: 99 F | RESPIRATION RATE: 19 BRPM | SYSTOLIC BLOOD PRESSURE: 155 MMHG | WEIGHT: 167 LBS | DIASTOLIC BLOOD PRESSURE: 89 MMHG

## 2022-08-27 DIAGNOSIS — E11.65 UNCONTROLLED TYPE 2 DIABETES MELLITUS WITH HYPERGLYCEMIA: Primary | ICD-10-CM

## 2022-08-27 DIAGNOSIS — R73.9 HYPERGLYCEMIA: ICD-10-CM

## 2022-08-27 LAB — POCT GLUCOSE: 452 MG/DL (ref 70–110)

## 2022-08-27 PROCEDURE — 82962 GLUCOSE BLOOD TEST: CPT

## 2022-08-27 PROCEDURE — 99282 EMERGENCY DEPT VISIT SF MDM: CPT | Mod: 25

## 2022-08-28 NOTE — ED PROVIDER NOTES
Encounter Date: 8/27/2022    SCRIBE #1 NOTE: I, Ashlie Flores, am scribing for, and in the presence of,  Anthony Davis MD. I have scribed the following portions of the note - Other sections scribed: HPI, ROS, PE.     History     Chief Complaint   Patient presents with    Hyperglycemia     Patient with history of IDDM found glucose to be greater than 600, reports no specific complaints     Greg Rogers Jr. is a 47 y.o. male, with a past medical history of DM, who presents to the ED with hyperglycemia onset prior to arrival. He reports that his CBG was over 600 when tested via home monitor. Patient states that his symptoms were likely triggered by eating cake and ice cream today. No exacerbating or alleviating factors. Patient denies other associated symptoms.     The history is provided by the patient.   Review of patient's allergies indicates:   Allergen Reactions    Adhesive      Past Medical History:   Diagnosis Date    Asthma     Chronic back pain     Depression     Diabetes mellitus     Pneumonia      History reviewed. No pertinent surgical history.  Family History   Problem Relation Age of Onset    Hypertension Mother     Heart disease Father     COPD Father      Social History     Tobacco Use    Smoking status: Every Day     Packs/day: 1.00     Types: Cigarettes    Smokeless tobacco: Never   Substance Use Topics    Alcohol use: No     Comment: former     Drug use: No     Review of Systems   Constitutional:  Negative for chills and fever.   HENT:  Negative for congestion, rhinorrhea and sore throat.    Eyes:  Negative for visual disturbance.   Respiratory:  Negative for cough and shortness of breath.    Cardiovascular:  Negative for chest pain.   Gastrointestinal:  Negative for abdominal pain, diarrhea, nausea and vomiting.   Endocrine:        Positive for hyperglycemia.   Genitourinary:  Negative for dysuria, frequency and hematuria.   Musculoskeletal:  Negative for back pain.   Skin:  Negative for  rash.   Neurological:  Negative for dizziness, weakness and headaches.   All other systems reviewed and are negative.    Physical Exam     Initial Vitals [08/27/22 2233]   BP Pulse Resp Temp SpO2   (!) 155/89 80 19 98.7 °F (37.1 °C) 96 %      MAP       --         Physical Exam    Nursing note and vitals reviewed.  Constitutional: He appears well-developed and well-nourished.   HENT:   Head: Normocephalic and atraumatic.   Eyes: Conjunctivae are normal.   Neck: Neck supple.   Normal range of motion.  Cardiovascular:  Normal rate, regular rhythm and normal heart sounds.     Exam reveals no gallop and no friction rub.       No murmur heard.  Pulmonary/Chest: Breath sounds normal. No respiratory distress. He has no wheezes. He has no rhonchi. He has no rales.   Abdominal: Abdomen is soft. There is no abdominal tenderness.   Musculoskeletal:         General: No edema. Normal range of motion.      Cervical back: Normal range of motion and neck supple.     Neurological: He is alert and oriented to person, place, and time. GCS score is 15. GCS eye subscore is 4. GCS verbal subscore is 5. GCS motor subscore is 6.   Skin: Skin is warm and dry.   Psychiatric: He has a normal mood and affect.       ED Course   Procedures  Labs Reviewed   POCT GLUCOSE - Abnormal; Notable for the following components:       Result Value    POCT Glucose 452 (*)     All other components within normal limits          Imaging Results    None          Medications - No data to display    Medical Decision Making:   History:   Old Medical Records: I decided to obtain old medical records.  Initial Assessment:   Greg Rogers Jr. is a 47 y.o. male, with a past medical history of DM, who presents to the ED with hyperglycemia onset prior to arrival. He reports that his CBG was over 600 when tested via home monitor. Patient states that his symptoms were likely triggered by eating cake and ice cream today. No exacerbating or alleviating factors. Patient  "denies other associated symptoms.   Clinical Tests:   Lab Tests: Ordered and Reviewed  ED Management:  Glucose was greater than 400.  Patient refused IV fluids, insulin and lab work, stating "I walked in and out of the ER with my sugar much higher than this".  He was counseled on the need to stay for further evaluation and treatment and also warned against the risks of leaving the emergency department against medical advice, including worsening of condition and possibly death.  He voiced understanding and signed AMA form prior to leaving the emergency department.        Scribe Attestation:   Scribe #1: I performed the above scribed service and the documentation accurately describes the services I performed. I attest to the accuracy of the note.               Clinical Impression:   Final diagnoses:  [R73.9] Hyperglycemia  [E11.65] Uncontrolled type 2 diabetes mellitus with hyperglycemia (Primary)        ED Disposition Condition    AMA         This document was produced by a scribe under my direction and in my presence. I agree with the content of the note and have made any necessary edits.     Dr. Davis    08/28/2022 12:59 AM          Anthony Davis MD  08/28/22 0059    "

## 2022-08-28 NOTE — ED TRIAGE NOTES
"Pt reports to ED via personal transportation with reports of CBG reading "high" when checking tonight; pt thinks his home monitor only reads up to 600; pt reports eating cake & ice cream today; upon arrival to ED, pt's , which pt states "that's my normal"; denies any complaints & states he's ready to go home; pt reports last taking Metformin 1,000mg BID yesterday and Lantus Insulin 40units HS aprox 2 weeks ago; pt AAOx4, wife at bedside  "

## 2022-08-28 NOTE — ED NOTES
After finding out CBG is 452, pt stating that he is ready to leave and go home; pt does not want any of the ordered tasks/blood work; pt educated on importance of staying for treatment and risks of leaving against medical advice (AMA); pt verbalizes understanding but still states he wants to leave at this time; pt reports that he will go home and take his RX Insulin and Metformin; pt speech clear and coherent; pt ambulates with strong steady gait; no distress noted; AMA form signed

## 2022-09-08 ENCOUNTER — HOSPITAL ENCOUNTER (EMERGENCY)
Facility: HOSPITAL | Age: 47
Discharge: HOME | End: 2022-09-08
Attending: EMERGENCY MEDICINE
Payer: MEDICARE

## 2022-09-08 ENCOUNTER — PATIENT MESSAGE (OUTPATIENT)
Dept: ADMINISTRATIVE | Facility: OTHER | Age: 47
End: 2022-09-08
Payer: MEDICARE

## 2022-09-08 VITALS
DIASTOLIC BLOOD PRESSURE: 79 MMHG | WEIGHT: 166 LBS | RESPIRATION RATE: 18 BRPM | SYSTOLIC BLOOD PRESSURE: 137 MMHG | HEART RATE: 97 BPM | TEMPERATURE: 100 F | HEIGHT: 69 IN | OXYGEN SATURATION: 97 % | BODY MASS INDEX: 24.59 KG/M2

## 2022-09-08 DIAGNOSIS — R50.9 SUBJECTIVE FEVER: ICD-10-CM

## 2022-09-08 DIAGNOSIS — R73.9 HYPERGLYCEMIA: ICD-10-CM

## 2022-09-08 DIAGNOSIS — U07.1 COVID-19 VIRUS DETECTED: ICD-10-CM

## 2022-09-08 DIAGNOSIS — U07.1 COVID-19 VIRUS INFECTION: Primary | ICD-10-CM

## 2022-09-08 PROBLEM — M54.50 CHRONIC BILATERAL LOW BACK PAIN WITHOUT SCIATICA: Status: ACTIVE | Noted: 2021-05-13

## 2022-09-08 PROBLEM — F17.210 NICOTINE DEPENDENCE, CIGARETTES, UNCOMPLICATED: Status: ACTIVE | Noted: 2021-05-13

## 2022-09-08 PROBLEM — I10 ESSENTIAL HYPERTENSION: Status: ACTIVE | Noted: 2021-05-13

## 2022-09-08 PROBLEM — Z79.4 TYPE 2 DIABETES MELLITUS WITH HYPERGLYCEMIA, WITH LONG-TERM CURRENT USE OF INSULIN: Status: ACTIVE | Noted: 2021-05-13

## 2022-09-08 PROBLEM — E11.65 TYPE 2 DIABETES MELLITUS WITH HYPERGLYCEMIA, WITH LONG-TERM CURRENT USE OF INSULIN: Status: ACTIVE | Noted: 2021-05-13

## 2022-09-08 PROBLEM — G89.29 CHRONIC BILATERAL LOW BACK PAIN WITHOUT SCIATICA: Status: ACTIVE | Noted: 2021-05-13

## 2022-09-08 PROBLEM — R51.9 HEADACHE DISORDER: Status: ACTIVE | Noted: 2021-05-13

## 2022-09-08 LAB
BACTERIA #/AREA URNS HPF: NORMAL /HPF
BILIRUB UR QL STRIP: NEGATIVE
CLARITY UR: CLEAR
COLOR UR: COLORLESS
CTP QC/QA: YES
CTP QC/QA: YES
GLUCOSE UR QL STRIP: ABNORMAL
HGB UR QL STRIP: NEGATIVE
KETONES UR QL STRIP: NEGATIVE
LEUKOCYTE ESTERASE UR QL STRIP: NEGATIVE
MICROSCOPIC COMMENT: NORMAL
NITRITE UR QL STRIP: NEGATIVE
PH UR STRIP: 6 [PH] (ref 5–8)
POC MOLECULAR INFLUENZA A AGN: NEGATIVE
POC MOLECULAR INFLUENZA B AGN: NEGATIVE
POCT GLUCOSE: 454 MG/DL (ref 70–110)
PROT UR QL STRIP: NEGATIVE
SARS-COV-2 RDRP RESP QL NAA+PROBE: POSITIVE
SP GR UR STRIP: 1.02 (ref 1–1.03)
URN SPEC COLLECT METH UR: ABNORMAL
UROBILINOGEN UR STRIP-ACNC: NEGATIVE EU/DL
YEAST URNS QL MICRO: NORMAL

## 2022-09-08 PROCEDURE — 81000 URINALYSIS NONAUTO W/SCOPE: CPT | Performed by: PHYSICIAN ASSISTANT

## 2022-09-08 PROCEDURE — 87502 INFLUENZA DNA AMP PROBE: CPT

## 2022-09-08 PROCEDURE — 25000003 PHARM REV CODE 250: Performed by: PHYSICIAN ASSISTANT

## 2022-09-08 PROCEDURE — U0002 COVID-19 LAB TEST NON-CDC: HCPCS | Performed by: NURSE PRACTITIONER

## 2022-09-08 PROCEDURE — 99284 EMERGENCY DEPT VISIT MOD MDM: CPT | Mod: 25

## 2022-09-08 PROCEDURE — 82962 GLUCOSE BLOOD TEST: CPT

## 2022-09-08 RX ORDER — IBUPROFEN 600 MG/1
600 TABLET ORAL
Status: COMPLETED | OUTPATIENT
Start: 2022-09-08 | End: 2022-09-08

## 2022-09-08 RX ORDER — PEDI MULTIVIT NO.27/FOLIC ACID 100 MCG
2 TABLET,CHEWABLE ORAL EVERY 4 HOURS PRN
Qty: 24 EACH | Refills: 0 | Status: SHIPPED | OUTPATIENT
Start: 2022-09-08 | End: 2022-09-15

## 2022-09-08 RX ORDER — IBUPROFEN 200 MG
1 TABLET ORAL DAILY
Qty: 7 PATCH | Refills: 0 | Status: SHIPPED | OUTPATIENT
Start: 2022-09-08

## 2022-09-08 RX ORDER — PROMETHAZINE HYDROCHLORIDE AND DEXTROMETHORPHAN HYDROBROMIDE 6.25; 15 MG/5ML; MG/5ML
5 SYRUP ORAL EVERY 4 HOURS PRN
Qty: 118 ML | Refills: 0 | Status: SHIPPED | OUTPATIENT
Start: 2022-09-08 | End: 2022-09-18

## 2022-09-08 RX ADMIN — IBUPROFEN 600 MG: 600 TABLET ORAL at 03:09

## 2022-09-08 NOTE — DISCHARGE INSTRUCTIONS

## 2022-09-08 NOTE — Clinical Note
"Greg"Jeet Rogers was seen and treated in our emergency department on 9/8/2022.     COVID-19 is present in our communities across the state. There is limited testing for COVID at this time, so not all patients can be tested. In this situation, your employee meets the following criteria:    Greg Rogers Jr. has met the criteria for COVID-19 testing and has a POSITIVE result. He can return to work once they are asymptomatic for 24 hours without the use of fever reducing medications AND at least five days from the first positive result. A mask is recommended for 5 days post quarantine.     If you have any questions or concerns, or if I can be of further assistance, please do not hesitate to contact me.    Sincerely,             Inder Contreras PA-C"

## 2022-09-08 NOTE — ED PROVIDER NOTES
"Encounter Date: 9/8/2022    SCRIBE #1 NOTE: I, BEBE WILL, am scribing for, and in the presence of,  Inder Contreras PA-C. I have scribed the following portions of the note - Other sections scribed: HPI, ROS.     History     Chief Complaint   Patient presents with    Fever     The patient reports fever x 2 days. Denies nausea, vomiting, diarrhea, cough, sob, runny nose, headaches, nasal congestion, dysuria. Patient states that he has been taking tylenol at home for the fever.      47-year-old male patient with a past medical history of Asthma, DM, and Pneumonia, presents to the ED with a chief complaint of fever for two days. Patient reports contact with family members who were positive for COVID-19 and has been "pre-cautious as possible". Patient states that he has been having a fever with sweat since last night and associated myalgias, congestion, rhinorrhea, cough, and sore throat. Patient states that he does have a PMHx of DM, and has been taking Metformin 1000 mg for 2x a day and Insulin every night. No other exacerbating or alleviating factors. Denies urinary abnormalities, diarrhea, abdominal pain, or other associated symptoms.     The history is provided by the patient. No  was used.   Review of patient's allergies indicates:   Allergen Reactions    Adhesive      Past Medical History:   Diagnosis Date    Asthma     Chronic back pain     Depression     Diabetes mellitus     Pneumonia      History reviewed. No pertinent surgical history.  Family History   Problem Relation Age of Onset    Hypertension Mother     Heart disease Father     COPD Father      Social History     Tobacco Use    Smoking status: Every Day     Packs/day: 1.00     Types: Cigarettes    Smokeless tobacco: Never   Substance Use Topics    Alcohol use: No     Comment: former     Drug use: No     Review of Systems   Constitutional:  Positive for diaphoresis and fever.   HENT:  Positive for congestion, rhinorrhea and sore " throat. Negative for trouble swallowing.    Respiratory:  Positive for cough. Negative for shortness of breath.    Cardiovascular:  Negative for chest pain.   Gastrointestinal:  Negative for abdominal pain, constipation, diarrhea, nausea and vomiting.   Genitourinary:  Negative for dysuria, flank pain, frequency and urgency.   Musculoskeletal:  Positive for myalgias. Negative for back pain.   Skin:  Negative for rash.   Neurological:  Negative for headaches.   All other systems reviewed and are negative.    Physical Exam     Initial Vitals [09/08/22 1502]   BP Pulse Resp Temp SpO2   136/83 107 18 99.2 °F (37.3 °C) 97 %      MAP       --         Physical Exam    Nursing note and vitals reviewed.  Constitutional: He appears well-developed and well-nourished. He is not diaphoretic. No distress.   HENT:   Head: Atraumatic.   Right Ear: External ear normal.   Left Ear: External ear normal.   Mouth/Throat: Oropharynx is clear and moist.   Eyes: Conjunctivae are normal.   Neck: No tracheal deviation present.   Normal range of motion.  Cardiovascular:  Normal rate and regular rhythm.           Pulmonary/Chest: No accessory muscle usage or stridor. No tachypnea. No respiratory distress.   Musculoskeletal:      Cervical back: Normal range of motion.     Neurological: He has normal strength. He displays no tremor. He displays no seizure activity. Coordination and gait normal.   Skin: Skin is intact. Capillary refill takes less than 2 seconds. No cyanosis.       ED Course   Procedures  Labs Reviewed   URINALYSIS, REFLEX TO URINE CULTURE - Abnormal; Notable for the following components:       Result Value    Color, UA Colorless (*)     Glucose, UA 4+ (*)     All other components within normal limits    Narrative:     Specimen Source->Urine   SARS-COV-2 RDRP GENE - Abnormal; Notable for the following components:    POC Rapid COVID Positive (*)     All other components within normal limits   POCT GLUCOSE - Abnormal; Notable for  the following components:    POCT Glucose 454 (*)     All other components within normal limits   URINALYSIS MICROSCOPIC    Narrative:     Specimen Source->Urine   POCT INFLUENZA A/B MOLECULAR          Imaging Results              X-Ray Chest AP Portable (Final result)  Result time 09/08/22 16:25:22      Final result by Jeet Landers MD (09/08/22 16:25:22)                   Impression:      No acute abnormality.      Electronically signed by: Jeet Landers  Date:    09/08/2022  Time:    16:25               Narrative:    EXAMINATION:  XR CHEST AP PORTABLE    CLINICAL HISTORY:  Fever, unspecified    TECHNIQUE:  Single frontal view of the chest was performed.    COMPARISON:  12/05/2020    FINDINGS:  The lungs are clear, with normal appearance of pulmonary vasculature and no pleural effusion or pneumothorax.    The cardiac silhouette is normal in size. The hilar and mediastinal contours are unremarkable.    Bones are intact.                                       Medications   ibuprofen tablet 600 mg (600 mg Oral Given 9/8/22 1519)     Medical Decision Making:   History:   Old Medical Records: I decided to obtain old medical records.  Clinical Tests:   Lab Tests: Ordered and Reviewed  Radiological Study: Ordered and Reviewed  ED Management:  COVID-19 positive.  No hypoxia or respiratory distress.  Chest x-ray normal.  Hyperglycemic without ketonuria suggestive of DKA.  We discuss further investigation of his hyperglycemia with blood work to further exclude DKA; declines.  States current hyperglycemia level is consistent with his baseline.  Did not take his DM meds today but has them available to him.  Requesting paxlovid and understands inherent risks of EUA medications.  Advising follow-up with PCP. Strict return precautions discussed.  Agreeable to plan.        Scribe Attestation:   Scribe #1: I performed the above scribed service and the documentation accurately describes the services I performed. I attest to the  accuracy of the note.               Clinical Impression:   Final diagnoses:  [R50.9] Subjective fever  [U07.1] COVID-19 virus infection (Primary)  [R73.9] Jose Antonio     Scribe attestation: I, Inder Contreras PA-C, personally performed the services described in this documentation. All medical record entries made by the scribe were at my direction and in my presence.  I have reviewed the chart and agree that the record reflects my personal performance and is accurate and complete.    ED Disposition Condition    Discharge Stable          ED Prescriptions       Medication Sig Dispense Start Date End Date Auth. Provider    nirmatrelvir-ritonavir 300 mg (150 mg x 2)-100 mg copackaged tablets (EUA) Take 3 tablets by mouth 2 (two) times daily for 5 days. Each dose contains 2 nirmatrelvir (pink tablets) and 1 ritonavir (white tablet). Take all 3 tablets together 30 tablet 9/8/2022 9/13/2022 Inder Contreras PA-C    promethazine-dextromethorphan (PROMETHAZINE-DM) 6.25-15 mg/5 mL Syrp Take 5 mLs by mouth every 4 (four) hours as needed (cough). 118 mL 9/8/2022 9/18/2022 Inder Contreras PA-C    PE-DM-ASA/wymnbj-GE-JY-ASA (PAVEL-SELTZER PLUS DAY-NIGHT) 7.8-325 mg(d)/ 6. mg(nt) TEfS Take 2 capsules by mouth every 4 (four) hours as needed. Do not exceed more than 10 capsules in 24 hours. Do not exceed recommended dose. Children under 12 years of age:  DO NOT USE. 24 each 9/8/2022 9/15/2022 Inder Contreras PA-C    nicotine (NICODERM CQ) 14 mg/24 hr Place 1 patch onto the skin once daily. 7 patch 9/8/2022 -- Inder Contreras PA-C          Follow-up Information       Follow up With Specialties Details Why Contact Info    Klever Max MD Family Medicine Schedule an appointment as soon as possible for a visit in 1 day For re-evaluation [address] [facility] [place] [id number] [phone]      [place] - Emergency Dept Emergency Medicine Go to  If symptoms worsen [address]  [facility]  [address]  [phone]             Inder Contreras PA-C  09/08/22 2141

## 2022-09-08 NOTE — ED TRIAGE NOTES
Pt. Reports he started to have a fever on yesterday. Pt. Reports he took 2 at home COVID test and they were negative. Pt. Reports he has been taking tylenol for his fevers. Pt. Denies any other symptoms.

## 2022-09-08 NOTE — FIRST PROVIDER EVALUATION
"Medical screening exam completed.  I have conducted a focused provider triage encounter, findings are as follows:    Brief history of present illness:  Fever for several days, T-max 102; last tylenol 12pm    Vitals:    09/08/22 1502   BP: 136/83   BP Location: Left arm   Patient Position: Sitting   Pulse: 107   Resp: 18   Temp: 99.2 °F (37.3 °C)   TempSrc: Oral   SpO2: 97%   Weight: 75.3 kg (166 lb)   Height: 5' 9" (1.753 m)       Pertinent physical exam:  NAD    Brief workup plan:  COVID, Flu    Preliminary workup initiated; this workup will be continued and followed by the physician or advanced practice provider that is assigned to the patient when roomed.  "

## 2022-09-15 ENCOUNTER — NURSE TRIAGE (OUTPATIENT)
Dept: ADMINISTRATIVE | Facility: CLINIC | Age: 47
End: 2022-09-15
Payer: MEDICARE

## 2022-09-16 NOTE — TELEPHONE ENCOUNTER
CV positive 9/8, speaking to wife, started on paxlovid, completed and now testing positive again and symptoms worse. Finished paxlovid x 2 days ago. Worsening cough and SOB.  with Spo2 98%. Discussed rebound COVID. Needs to be seen within 3-4 hours. PCP doesn't have after hours service. ED or [facility]. VU. Will re isolate.   Reason for Disposition   MILD difficulty breathing (e.g., minimal/no SOB at rest, SOB with walking, pulse <100)    Additional Information   Negative: SEVERE difficulty breathing (e.g., struggling for each breath, speaks in single words)   Negative: Difficult to awaken or acting confused (e.g., disoriented, slurred speech)   Negative: Bluish (or gray) lips or face now   Negative: Shock suspected (e.g., cold/pale/clammy skin, too weak to stand, low BP, rapid pulse)   Negative: Sounds like a life-threatening emergency to the triager   Negative: SEVERE or constant chest pain or pressure  (Exception: Mild central chest pain, present only when coughing.)   Negative: MODERATE difficulty breathing (e.g., speaks in phrases, SOB even at rest, pulse 100-120)   Negative: [1] Headache AND [2] stiff neck (can't touch chin to chest)   Negative: Oxygen level (e.g., pulse oximetry) 90 percent or lower   Negative: Chest pain or pressure   Negative: Patient sounds very sick or weak to the triager    Protocols used: Coronavirus (COVID-19) Diagnosed or [id number]

## 2023-03-18 ENCOUNTER — HOSPITAL ENCOUNTER (EMERGENCY)
Facility: HOSPITAL | Age: 48
Discharge: HOME | End: 2023-03-19
Attending: EMERGENCY MEDICINE
Payer: MEDICARE

## 2023-03-18 DIAGNOSIS — I10 HYPERTENSION, UNSPECIFIED TYPE: Primary | ICD-10-CM

## 2023-03-18 DIAGNOSIS — R51.9 ACUTE NONINTRACTABLE HEADACHE, UNSPECIFIED HEADACHE TYPE: ICD-10-CM

## 2023-03-18 PROCEDURE — 99283 EMERGENCY DEPT VISIT LOW MDM: CPT

## 2023-03-19 VITALS
OXYGEN SATURATION: 97 % | HEART RATE: 86 BPM | DIASTOLIC BLOOD PRESSURE: 83 MMHG | WEIGHT: 166 LBS | HEIGHT: 69 IN | SYSTOLIC BLOOD PRESSURE: 152 MMHG | BODY MASS INDEX: 24.59 KG/M2 | RESPIRATION RATE: 16 BRPM | TEMPERATURE: 99 F

## 2023-03-19 PROCEDURE — 25000003 PHARM REV CODE 250: Performed by: EMERGENCY MEDICINE

## 2023-03-19 RX ORDER — METOPROLOL SUCCINATE 25 MG/1
25 TABLET, EXTENDED RELEASE ORAL ONCE
Status: COMPLETED | OUTPATIENT
Start: 2023-03-19 | End: 2023-03-19

## 2023-03-19 RX ORDER — METOCLOPRAMIDE 10 MG/1
10 TABLET ORAL
Status: COMPLETED | OUTPATIENT
Start: 2023-03-19 | End: 2023-03-19

## 2023-03-19 RX ADMIN — METOPROLOL SUCCINATE 25 MG: 25 TABLET, EXTENDED RELEASE ORAL at 01:03

## 2023-03-19 RX ADMIN — METOCLOPRAMIDE 10 MG: 10 TABLET ORAL at 01:03

## 2023-03-19 NOTE — ED PROVIDER NOTES
Encounter Date: 3/18/2023       History     Chief Complaint   Patient presents with    Hypertension     Reports multiple home readings of 170+/100. Pt states HA  to posterior.Denies chest pain. Pt states compliant with Metoprolol. Denies blurry vision or weakness.      48-year-old male with history of diabetes, headaches presenting with concern for hypertension.  Patient notes mild posterior headache.  Reports symptoms started last night.  Reports history of similar headaches.  Denies fevers, chills, numbness, weakness, changes in vision, neck pain or stiffness.  Denies trauma or injury.  Patient notes he did not take his medication last night.  Denies chest pain, shortness of breath, lower extremity edema.  Previously in usual state of health.    Review of patient's allergies indicates:   Allergen Reactions    Adhesive      Past Medical History:   Diagnosis Date    Asthma     Chronic back pain     Depression     Diabetes mellitus     Pneumonia      History reviewed. No pertinent surgical history.  Family History   Problem Relation Age of Onset    Hypertension Mother     Heart disease Father     COPD Father      Social History     Tobacco Use    Smoking status: Every Day     Packs/day: 1.00     Types: Cigarettes    Smokeless tobacco: Never   Substance Use Topics    Alcohol use: No     Comment: former     Drug use: No     Review of Systems   Constitutional:  Negative for fever.   Eyes:  Negative for photophobia and visual disturbance.   Respiratory:  Negative for shortness of breath.    Cardiovascular:  Negative for chest pain.   Gastrointestinal:  Negative for nausea.   Genitourinary:  Negative for dysuria.   Musculoskeletal:  Negative for back pain.   Skin:  Negative for rash.   Neurological:  Positive for headaches. Negative for weakness.     Physical Exam     Initial Vitals [03/18/23 2322]   BP Pulse Resp Temp SpO2   (!) 176/96 87 18 98.7 °F (37.1 °C) 100 %      MAP       --         Physical Exam    Nursing note  and vitals reviewed.  Constitutional: He appears well-developed and well-nourished. He is not diaphoretic. No distress.   HENT:   Head: Normocephalic and atraumatic.   Eyes: Conjunctivae and EOM are normal. Pupils are equal, round, and reactive to light. No scleral icterus.   Neck: Neck supple.   Normal range of motion.  Cardiovascular:  Normal rate, regular rhythm, normal heart sounds and intact distal pulses.     Exam reveals no gallop and no friction rub.       No murmur heard.  Pulmonary/Chest: Breath sounds normal. No stridor. No respiratory distress. He has no wheezes. He has no rhonchi. He has no rales.   Abdominal: Abdomen is soft. Bowel sounds are normal. He exhibits no distension. There is no abdominal tenderness. There is no rebound and no guarding.   Musculoskeletal:         General: No tenderness or edema. Normal range of motion.      Cervical back: Normal range of motion and neck supple.     Neurological: He is alert and oriented to person, place, and time. He has normal strength. No cranial nerve deficit. GCS score is 15. GCS eye subscore is 4. GCS verbal subscore is 5. GCS motor subscore is 6.   Skin: Skin is warm and dry. No rash noted.   Psychiatric: He has a normal mood and affect. His behavior is normal.       ED Course   Procedures  Labs Reviewed - No data to display       Imaging Results    None          Medications   metoclopramide HCl tablet 10 mg (10 mg Oral Given 3/19/23 0111)   metoprolol succinate (TOPROL-XL) 24 hr tablet 25 mg (25 mg Oral Given by Provider 3/19/23 0109)     Medical Decision Making:   Initial Assessment:   48-year-old male presenting with relatively asymptomatic hypertension.  Headache not new for patient.  Blood pressure improved spontaneously in the emergency department without intervention.  Patient electing to go home.  Patient denies significant complaints.  No red flags.  No evidence of acute end-organ damage.  Patient given Reglan to go home with.  Patient reports  he will take the rest of his medication at home.  Believe he is safe for discharge home.  Discussed return precautions.                        Clinical Impression:   Final diagnoses:  [I10] Hypertension, unspecified type (Primary)  [R51.9] Acute nonintractable headache, unspecified headache type        ED Disposition Condition    Discharge Stable          ED Prescriptions    None       Follow-up Information       Follow up With Specialties Details Why Contact Info    Mike Bliss MD Family Medicine Schedule an appointment as soon as possible for a visit   [address]  [address] [facility] [address]  [phone]      [facility] Emergency Dept Emergency Medicine  As needed, If symptoms worsen [address]  [facility] [address] [phone]             Kam Mckinney MD  03/19/23 1769

## 2023-03-19 NOTE — DISCHARGE INSTRUCTIONS
You were seen in the emergency department for high blood pressure and a headache.  Your exam is reassuring.  Please follow up with your primary care provider this week.  Please return forany new or worsening pain, nausea, vomiting, difficulty breathing, changes in vision, severe headache, numbness, weakness, or you become concerned in any other way.

## 2023-06-25 ENCOUNTER — HOSPITAL ENCOUNTER (EMERGENCY)
Facility: HOSPITAL | Age: 48
Discharge: LEFT BEFORE DISCHARGE | End: 2023-06-25
Attending: EMERGENCY MEDICINE
Payer: MEDICARE

## 2023-06-25 VITALS
HEART RATE: 81 BPM | DIASTOLIC BLOOD PRESSURE: 86 MMHG | HEIGHT: 69 IN | SYSTOLIC BLOOD PRESSURE: 132 MMHG | OXYGEN SATURATION: 97 % | RESPIRATION RATE: 16 BRPM | WEIGHT: 170 LBS | TEMPERATURE: 98 F | BODY MASS INDEX: 25.18 KG/M2

## 2023-06-25 DIAGNOSIS — R73.9 HYPERGLYCEMIA: Primary | ICD-10-CM

## 2023-06-25 LAB
ALBUMIN SERPL BCP-MCNC: 3.5 G/DL (ref 3.5–5.2)
ALP SERPL-CCNC: 118 U/L (ref 55–135)
ALT SERPL W/O P-5'-P-CCNC: 51 U/L (ref 10–44)
ANION GAP SERPL CALC-SCNC: 12 MMOL/L (ref 8–16)
AST SERPL-CCNC: 38 U/L (ref 10–40)
BASOPHILS # BLD AUTO: 0.05 K/UL (ref 0–0.2)
BASOPHILS NFR BLD: 0.6 % (ref 0–1.9)
BILIRUB SERPL-MCNC: 0.2 MG/DL (ref 0.1–1)
BUN SERPL-MCNC: 12 MG/DL (ref 6–20)
CALCIUM SERPL-MCNC: 10.2 MG/DL (ref 8.7–10.5)
CHLORIDE SERPL-SCNC: 98 MMOL/L (ref 95–110)
CO2 SERPL-SCNC: 22 MMOL/L (ref 23–29)
CREAT SERPL-MCNC: 1 MG/DL (ref 0.5–1.4)
DIFFERENTIAL METHOD: ABNORMAL
EOSINOPHIL # BLD AUTO: 0.2 K/UL (ref 0–0.5)
EOSINOPHIL NFR BLD: 2.4 % (ref 0–8)
ERYTHROCYTE [DISTWIDTH] IN BLOOD BY AUTOMATED COUNT: 13.2 % (ref 11.5–14.5)
EST. GFR  (NO RACE VARIABLE): >60 ML/MIN/1.73 M^2
GLUCOSE SERPL-MCNC: 327 MG/DL (ref 70–110)
HCT VFR BLD AUTO: 37.9 % (ref 40–54)
HGB BLD-MCNC: 12.9 G/DL (ref 14–18)
IMM GRANULOCYTES # BLD AUTO: 0.03 K/UL (ref 0–0.04)
IMM GRANULOCYTES NFR BLD AUTO: 0.4 % (ref 0–0.5)
LYMPHOCYTES # BLD AUTO: 3.1 K/UL (ref 1–4.8)
LYMPHOCYTES NFR BLD: 38 % (ref 18–48)
MCH RBC QN AUTO: 29.2 PG (ref 27–31)
MCHC RBC AUTO-ENTMCNC: 34 G/DL (ref 32–36)
MCV RBC AUTO: 86 FL (ref 82–98)
MONOCYTES # BLD AUTO: 0.4 K/UL (ref 0.3–1)
MONOCYTES NFR BLD: 5.4 % (ref 4–15)
NEUTROPHILS # BLD AUTO: 4.3 K/UL (ref 1.8–7.7)
NEUTROPHILS NFR BLD: 53.2 % (ref 38–73)
NRBC BLD-RTO: 0 /100 WBC
PLATELET # BLD AUTO: 142 K/UL (ref 150–450)
PMV BLD AUTO: 9.9 FL (ref 9.2–12.9)
POCT GLUCOSE: 313 MG/DL (ref 70–110)
POTASSIUM SERPL-SCNC: 4.2 MMOL/L (ref 3.5–5.1)
PROT SERPL-MCNC: 7.6 G/DL (ref 6–8.4)
RBC # BLD AUTO: 4.42 M/UL (ref 4.6–6.2)
SODIUM SERPL-SCNC: 132 MMOL/L (ref 136–145)
WBC # BLD AUTO: 8.08 K/UL (ref 3.9–12.7)

## 2023-06-25 PROCEDURE — 85025 COMPLETE CBC W/AUTO DIFF WBC: CPT | Performed by: EMERGENCY MEDICINE

## 2023-06-25 PROCEDURE — 82962 GLUCOSE BLOOD TEST: CPT

## 2023-06-25 PROCEDURE — 99283 EMERGENCY DEPT VISIT LOW MDM: CPT

## 2023-06-25 PROCEDURE — 83036 HEMOGLOBIN GLYCOSYLATED A1C: CPT | Performed by: EMERGENCY MEDICINE

## 2023-06-25 PROCEDURE — 80053 COMPREHEN METABOLIC PANEL: CPT | Performed by: EMERGENCY MEDICINE

## 2023-06-25 NOTE — ED NOTES
"PT refusing gown, monitor. Informed of plan of care ordered by Dr. Robles. Pt refusing all treatment other than "lab work." Dr. Robles at  speaking with patient.   "

## 2023-06-25 NOTE — ED PROVIDER NOTES
Encounter Date: 6/25/2023    SCRIBE #1 NOTE: I, Yvette Bear, am scribing for, and in the presence of,  Joni Robles MD. I have scribed the following portions of the note - Other sections scribed: HPI, ROS.     History     Chief Complaint   Patient presents with    Hyperglycemia     Pt reports was sent by MD due to hyperglycemia.  Also reports KEITH.      Greg Rogers Jr. is a 48 y.o. male, with a PMHx of type 2 diabetes, HTN, pneumonia, asthma, headache disorder, cigarette u, depression who presents to the ED with hyperglycemia that began 2 days ago. Also reports a headache that began yesterday. Additional history is provided by independent historian: pt's wife, who states she called pt's doctor's office 2 days ago when pt's blood glucose measured approximately 500, then again today when his blood glucose was 367, and was then directed to the ED. Pt endorses 10 units of Novolog 3x per day, as well as 30 units of Lantus once a day before bed. Pt's wife states that pt's meds have never been adjusted and that he has never been on a sliding scale. Pt has had mild polyphagia, but denies polydipsia. Pt has appointment with endocrinologist tomorrow in the morning. No other exacerbating or alleviating factors. Denies nausea, abdominal pain, chest pain, SOB, problems urinating, or other associated symptoms.       The history is provided by the patient and the spouse. No  was used.   Review of patient's allergies indicates:   Allergen Reactions    Adhesive      Past Medical History:   Diagnosis Date    Asthma     Chronic back pain     Depression     Diabetes mellitus     Pneumonia      No past surgical history on file.  Family History   Problem Relation Age of Onset    Hypertension Mother     Heart disease Father     COPD Father      Social History     Tobacco Use    Smoking status: Every Day     Packs/day: 1.00     Types: Cigarettes    Smokeless tobacco: Never   Substance Use Topics     Alcohol use: No     Comment: former     Drug use: No     Review of Systems   HENT: Negative.     Eyes: Negative.    Respiratory: Negative.     Cardiovascular: Negative.    Gastrointestinal: Negative.    Endocrine: Positive for polyphagia (mild).        (+) Hyperglycemia.   Genitourinary: Negative.    Musculoskeletal: Negative.    Allergic/Immunologic: Negative.    Neurological:  Positive for headaches.     Physical Exam     Initial Vitals [06/25/23 1759]   BP Pulse Resp Temp SpO2   132/86 81 16 98.2 °F (36.8 °C) 97 %      MAP       --         Physical Exam    Nursing note and vitals reviewed.  Constitutional: He appears well-developed and well-nourished. He is not diaphoretic. No distress.   HENT:   Head: Normocephalic and atraumatic.   Nose: Nose normal.   Eyes: EOM are normal. Pupils are equal, round, and reactive to light.   Neck: Neck supple. No tracheal deviation present. No JVD present.   Normal range of motion.  Cardiovascular:  Normal rate, regular rhythm, normal heart sounds and intact distal pulses.           Pulmonary/Chest: Breath sounds normal. No respiratory distress. He has no wheezes. He has no rhonchi. He has no rales.   Abdominal: Abdomen is soft. Bowel sounds are normal. He exhibits no distension. There is no abdominal tenderness. There is no rebound and no guarding.   Musculoskeletal:         General: No tenderness or edema. Normal range of motion.      Cervical back: Normal range of motion and neck supple.     Neurological: He is alert and oriented to person, place, and time. He has normal strength.   Skin: Skin is warm and dry. Capillary refill takes less than 2 seconds. No rash noted. No erythema.       ED Course   Procedures  Labs Reviewed   CBC W/ AUTO DIFFERENTIAL - Abnormal; Notable for the following components:       Result Value    RBC 4.42 (*)     Hemoglobin 12.9 (*)     Hematocrit 37.9 (*)     Platelets 142 (*)     All other components within normal limits   COMPREHENSIVE METABOLIC  PANEL - Abnormal; Notable for the following components:    Sodium 132 (*)     CO2 22 (*)     Glucose 327 (*)     ALT 51 (*)     All other components within normal limits   POCT GLUCOSE - Abnormal; Notable for the following components:    POCT Glucose 313 (*)     All other components within normal limits   HEMOGLOBIN A1C          Imaging Results    None          Medications - No data to display  Medical Decision Making:   History:   Old Medical Records: I decided to obtain old medical records.  Clinical Tests:   Lab Tests: Ordered and Reviewed                MDM:    48-year-old male with past medical history as noted above presenting with concerns for hyperglycemia.  Physical exam as noted above.  ED workup pending.  Upon reassessment patient does not want any treatment, does not want to be here.  He requests lab work for his physician to utilize tomorrow.  He does not want IV fluids, does not want any insulin.  Patient reports that he would rather have his glucose high rather than low and feel terrible.  Discussed this extensively with the patient at bedside.  Patient still refused any further treatment, will sign out AMA at this time in stable condition.            I, Joni Robles M.D., personally performed the services described in this documentation. All medical record entries made by the scribe were at my direction and in my presence.  I have reviewed the chart and agree that the record reflects my personal performance and is accurate and complete.    Clinical Impression:   Final diagnoses:  [R73.9] Hyperglycemia (Primary)        ED Disposition Condition    Discharge Stable          ED Prescriptions    None       Follow-up Information       Follow up With Specialties Details Why Contact Info    [facility] Emergency Medicine Go to  If symptoms worsen [address] [place] [address] [phone]    Mike Bliss MD Family Medicine Go in 1 day  [address]  [address]  [facility] [address]  [phone]               Joni Robles MD  06/26/23 0936

## 2023-06-25 NOTE — ED NOTES
PT refused all care other than straight venipuncture with lab testing. Dr. Robles discussed all risks including death. PT [id number] continue to refuse all care and requests to sign AMA to leave after labs are drawn. AMA form filled out, signed and pt ambulated out of ED with adult female.

## 2023-06-26 LAB
ESTIMATED AVG GLUCOSE: 332 MG/DL (ref 68–131)
HBA1C MFR BLD: 13.2 % (ref 4–5.6)

## 2023-09-24 ENCOUNTER — HOSPITAL ENCOUNTER (EMERGENCY)
Facility: HOSPITAL | Age: 48
Discharge: LEFT BEFORE DISCHARGE | End: 2023-09-24
Attending: EMERGENCY MEDICINE
Payer: MEDICARE

## 2023-09-24 VITALS
SYSTOLIC BLOOD PRESSURE: 156 MMHG | HEART RATE: 88 BPM | RESPIRATION RATE: 20 BRPM | TEMPERATURE: 98 F | OXYGEN SATURATION: 98 % | BODY MASS INDEX: 19.99 KG/M2 | DIASTOLIC BLOOD PRESSURE: 81 MMHG | HEIGHT: 69 IN | WEIGHT: 135 LBS

## 2023-09-24 DIAGNOSIS — R07.9 CHEST PAIN: ICD-10-CM

## 2023-09-24 PROCEDURE — 93010 ELECTROCARDIOGRAM REPORT: CPT | Mod: ,,, | Performed by: INTERNAL MEDICINE

## 2023-09-24 PROCEDURE — 93005 ELECTROCARDIOGRAM TRACING: CPT

## 2023-09-24 PROCEDURE — 93010 EKG 12-LEAD: ICD-10-PCS | Mod: ,,, | Performed by: INTERNAL MEDICINE

## 2023-09-24 PROCEDURE — 99284 EMERGENCY DEPT VISIT MOD MDM: CPT | Mod: 25

## 2023-09-24 RX ORDER — IPRATROPIUM BROMIDE AND ALBUTEROL SULFATE 2.5; .5 MG/3ML; MG/3ML
3 SOLUTION RESPIRATORY (INHALATION)
Status: DISCONTINUED | OUTPATIENT
Start: 2023-09-24 | End: 2023-09-24 | Stop reason: HOSPADM

## 2023-09-24 NOTE — ED TRIAGE NOTES
Pt reports to the ED for CC of cough. Pt states the cough is more severe when he lays down. Pt also began having pain in his left chest and down his left arm last night. Pt does endorse smoking cigarettes, has hx of diabetes, hx of HTN. Pt is compliant with medications. Pt currently hypertensive at 167/106. Pt is alert and oriented and in no apparent distress at this time.

## 2023-09-25 NOTE — DISCHARGE INSTRUCTIONS
You were leaving this hospital against medical advice.  Risks have been discussed.  I strongly advised follow up with primary care physician as soon as possible.  Return to ED if symptoms persist or worsen including increasing shortness of breath, chest pain, weakness.

## 2023-09-25 NOTE — ED PROVIDER NOTES
Encounter Date: 9/24/2023       History     Chief Complaint   Patient presents with    Cough     Reports cough and sob x2-3 days. Hx asthma. Denies any treatments at home.     48-year-old male with past medical history of diabetes, hypertension and asthma presents with worsening cough chest pain.  Patient notes increase in coughing fits with production of white sputum.  He states this is not normal for him.  He notes that coughing fits cause him to become short of breath and feel nauseous.  He also notes that yesterday evening he noticed left-sided chest pain that radiated down left arm after playing with his dog.  He states this has never happened before.  Denies any numbness and tingling to the hand but just room sizes shooting pain.  Pain is reproducible on exam with palpation along with raising the shoulder.  He is not on any maintenance medication for asthma.  He is a daily chronic smoker.  His blood glucoses uncontrolled but is currently working with Dr. Keene his primary care physician to find appropriate regimen.  Primary care physician was last met with 3 days ago.  Patient denies any fevers, headache, abdominal pain, nausea, vomiting.    The history is provided by the patient. No  was used.     Review of patient's allergies indicates:   Allergen Reactions    Adhesive      Past Medical History:   Diagnosis Date    Asthma     Chronic back pain     Depression     Diabetes mellitus     Pneumonia      No past surgical history on file.  Family History   Problem Relation Age of Onset    Hypertension Mother     Heart disease Father     COPD Father      Social History     Tobacco Use    Smoking status: Every Day     Current packs/day: 1.00     Types: Cigarettes    Smokeless tobacco: Never   Substance Use Topics    Alcohol use: No     Comment: former     Drug use: No     Review of Systems   Constitutional:  Negative for appetite change, chills and fever.   HENT:  Negative for  congestion, ear pain, rhinorrhea, sore throat, trouble swallowing and voice change.    Eyes:  Negative for redness.   Respiratory:  Positive for cough, chest tightness and shortness of breath. Negative for wheezing and stridor.    Cardiovascular:  Positive for chest pain. Negative for palpitations and leg swelling.   Gastrointestinal:  Negative for abdominal distention, abdominal pain, constipation, diarrhea, nausea and vomiting.   Genitourinary:  Negative for dysuria, frequency, hematuria and urgency.   Musculoskeletal:  Negative for back pain, myalgias and neck pain.   Skin:  Negative for rash.   Neurological:  Negative for dizziness, speech difficulty, weakness, light-headedness, numbness and headaches.   Hematological:  Does not bruise/bleed easily.   Psychiatric/Behavioral:  Negative for confusion.        Physical Exam     Initial Vitals [09/24/23 1813]   BP Pulse Resp Temp SpO2   (!) 167/106 106 20 98 °F (36.7 °C) 97 %      MAP       --         Physical Exam    Nursing note and vitals reviewed.  Constitutional: He appears well-developed and well-nourished. He is not diaphoretic. No distress.   Patient lying comfortably on exam respiratory distress.  Speaking in full sentences without difficulty.   HENT:   Head: Normocephalic and atraumatic.   Right Ear: External ear normal.   Left Ear: External ear normal.   Nose: Nose normal.   Mouth/Throat: Oropharynx is clear and moist and mucous membranes are normal. No oropharyngeal exudate.   Patent   Eyes: Conjunctivae and EOM are normal. Pupils are equal, round, and reactive to light. Right eye exhibits no discharge. Left eye exhibits no discharge. Right conjunctiva is not injected. Left conjunctiva is not injected. No scleral icterus.   sclera anicteric   Neck: Neck supple.   Normal range of motion.   Full passive range of motion without pain.     Cardiovascular:  Normal rate, regular rhythm, S1 normal, S2 normal, normal heart sounds and intact distal pulses.     Exam  reveals no gallop and no friction rub.       No murmur heard.  Pulses:       Radial pulses are 2+ on the right side and 2+ on the left side.   Pulmonary/Chest: Effort normal. No stridor. No respiratory distress. He has wheezes. He exhibits tenderness.   Audible wheeze in right upper lobe.  Chest tenderness in substernal and left pectoral area.  Tenderness also noted to lateral rib area.   Abdominal: Abdomen is soft. He exhibits no distension. There is no abdominal tenderness.   Musculoskeletal:         General: Tenderness present. No edema. Normal range of motion.      Cervical back: Full passive range of motion without pain, normal range of motion and neck supple.      Comments: Good active ROM of all extremities. No lower extremity edema or cyanosis.  Left shoulder with full range of motion and 5/5 strength.  Notable discomfort when ABD conducting greater than 90°.     Lymphadenopathy:     He has no cervical adenopathy.   Neurological: He is alert and oriented to person, place, and time. He has normal strength. No cranial nerve deficit or sensory deficit. Gait normal.   A&Ox4, normal speech   Skin: Skin is warm. No ecchymosis and no rash noted.   Psychiatric: He has a normal mood and affect. Thought content normal.       ED Course   Procedures  Labs Reviewed   CBC W/ AUTO DIFFERENTIAL   COMPREHENSIVE METABOLIC PANEL   TROPONIN I   B-TYPE NATRIURETIC PEPTIDE   POCT GLUCOSE MONITORING CONTINUOUS     EKG Readings: (Independently Interpreted)   EKG with normal sinus rhythm.  Ventricular rate of 91.  No prolonged VT.  No widened QT. no ST elevation or T-wave abnormality         Imaging Results              X-Ray Chest AP Portable (Final result)  Result time 09/24/23 19:31:42      Final result by Sophie Loja MD (09/24/23 19:31:42)                   Impression:      No acute cardiopulmonary process identified.      Electronically signed by: Sophie Loja MD  Date:    09/24/2023  Time:    19:31                Narrative:    EXAMINATION:  XR CHEST AP PORTABLE    CLINICAL HISTORY:  Asthma;    TECHNIQUE:  Single frontal view of the chest was performed.    COMPARISON:  September 2022.    FINDINGS:  Cardiac silhouette is normal in size.  Lungs are symmetrically expanded.  No evidence of focal consolidative process, pneumothorax, or significant pleural effusion.  No acute osseous abnormality identified.                                       Medications   albuterol-ipratropium 2.5 mg-0.5 mg/3 mL nebulizer solution 3 mL (3 mLs Nebulization Not Given 9/24/23 1915)     Medical Decision Making  The patient presents to the ER with chest pain. Differential Diagnosis for chest pain includes ischemic chest pain (STEMI, NonSTEMI, or unstable angina), pulmonary embolism, aortic dissection, pericarditis, chest wall pain (rib strain, muscle strain, shingles), pneumonia, esophageal rupture, referred pain from the abdomen (biliary colic, cholecystitis, gastritis, gas pains, pancreatitis), anxiety or other causes of chest pain (GERD, esophageal spasm).   On PE, patient lying comfortably, speaking in full sentences without difficulty.  Right upper lobe wheezing heard on auscultation. No tachypnea or respiratory distress.  O2 saturation is 98%.  Reproducible chest pain with palpation over left pectoralis muscle and left shoulder.   Cardiac workup started along DuoNeb for wheezing. Chest x-ray with no cardiopulmonary process identified.  EKG unremarkable for any ST elevation or T-wave abnormality.  While waiting for results, patient states he is ready to leave because he is not eaten all day and is hungry.  I emphasized the importance of staying to rule out any major life-threatening casualties.  He states that he is aware of risks and still would like to leave.    Based on physical exam and history, patient's chest pain is likely musculoskeletal related to increase in coughing episodes likely secondary to asthma exacerbation.  This can not be  confirmed nor can cardiac abnormality ruled out at this time due to incomplete workup.     The patient has decision making capacity . Patient has chosen to refuse medical evaluation/treatment and is able to communicate this verbally. Patient understands the relative risks, benefits, alternatives and consequences. Patient is able to reason, gives an adequate explanation of why he refuses evaluation/treatment.  He states that he is hungry and ready to eat dinner.  This decision seems consistent with his value system and goals.    Return precautions given including worsening chest pain, shortness of breath, fevers, weakness.  Patient advised to follow up with primary care physician as soon as possible for evaluation and medical management as necessary.  Patient is stable at this time and ready to leave.    Amount and/or Complexity of Data Reviewed  Radiology: ordered.  ECG/medicine tests: ordered and independent interpretation performed. Decision-making details documented in ED Course.     Details: EKG with normal sinus rhythm.  Ventricular rate of 91.  No prolonged NE.  No widened QT. no ST elevation or T-wave abnormality      Risk  Prescription drug management.  Diagnosis or treatment significantly limited by social determinants of health.               ED Course as of 09/24/23 2107   Sun Sep 24, 2023   1939 Patient states he is ready to leave because he is hungry he has not eaten all day.  I advised patient that I do not recommend he leave prior to workup being completed due to chest pain and shortness of breath with extensive medical history.  Patient explains that he is still ready to leave.  However he would have to sign in against medical advice form which he is agreeable to.  We discussed the potential dangers of him leaving prior to workup completing including myocardial infarction, pneumonia, electrolyte abnormality, anemia, cardiomegaly.  Patient is on face by this information.  We will provide AMA form now.  [CC]   1951 Patient signed AMA form while I and nurse witnessed. [CC]      ED Course User Index  [CC] Susy De Jesus PA-C                    Clinical Impression:   Final diagnoses:  [R07.9] Chest pain        ED Disposition Condition    AMA Stable                Susy De Jesus PA-C  09/24/23 2120

## 2025-07-11 DIAGNOSIS — G43.709 CHRONIC MIGRAINE WITHOUT AURA WITHOUT STATUS MIGRAINOSUS, NOT INTRACTABLE: Primary | ICD-10-CM

## 2025-07-18 ENCOUNTER — OFFICE VISIT (OUTPATIENT)
Dept: NEUROLOGY | Facility: CLINIC | Age: 50
End: 2025-07-18
Payer: MEDICARE

## 2025-07-18 VITALS
SYSTOLIC BLOOD PRESSURE: 119 MMHG | HEART RATE: 83 BPM | DIASTOLIC BLOOD PRESSURE: 67 MMHG | WEIGHT: 151.25 LBS | BODY MASS INDEX: 22.33 KG/M2

## 2025-07-18 DIAGNOSIS — G25.0 BENIGN ESSENTIAL TREMOR: ICD-10-CM

## 2025-07-18 DIAGNOSIS — G43.109 MIGRAINE WITH AURA AND WITHOUT STATUS MIGRAINOSUS, NOT INTRACTABLE: Primary | ICD-10-CM

## 2025-07-18 DIAGNOSIS — G43.709 CHRONIC MIGRAINE WITHOUT AURA WITHOUT STATUS MIGRAINOSUS, NOT INTRACTABLE: ICD-10-CM

## 2025-07-18 PROCEDURE — 99999 PR PBB SHADOW E&M-EST. PATIENT-LVL III: CPT | Mod: PBBFAC,,,

## 2025-07-18 NOTE — PROGRESS NOTES
[facility] VISIT    Chief Complaint and Duration     Chief Complaint   Patient presents with    Consult     Referred by  MARILUZ BURT  Migraines         for 2 years.    History of Present Illness     Greg Rogers Jr. is a 50 y.o. right handed male with a history of multiple medical diagnoses as listed below that presents for headaches.    Referral received from primary care provider, [facility]    Significant PMH of DM, HTN, hep C, asthma, ADHD, Bipolar, migranes, anxiety, deprerssion, chronic pain, anemia, asthma, tobacco abuse-nicotine dependence, insomnia    Presents to clinic visit with spouse     History of Present Illness:   PMHx negative for TBI, Meningitis, Aneurysms, Kidney Stones, GI bleed, osteoporosis, CAD/MI, CVA/TIA, cancer  + asthma, diabetes  Family Hx yes for Migraines     Headache History:  Patient reports a 2 year history of headaches/migraines with onset inciting factor of presentation during intimacy.  Previous headache history consistent with migraines recent increased in frequency.  Location unilateral left temporalis retro-orbital and holocephalic described as pounding, pulsating, sharp ache.  Duration of headache hours, intensity range 7-10/10 with sec of time to peak to most intense.  Frequency 5/30 headache days per month with 5 of those being debilitating.  Known triggers of bright lights strong smells sexual intercourse.  Aggravating factors of lights and smells alleviating factors sleep lying down in dark room cold compress.  No recent changes denies prodrome or aura denies headache on today time of day headaches can occurs anytime.  Denies snoring does not wake feeling refreshed but can get resolution of headache with sleep.  Associated symptoms reported as photophobia, Osmophobia, nasal drainage impaired concentration.  Reported symptoms negative of increased intracranial pressure, cranial autonomic, vestibular, basilar, TCA  features.     Treatments Tried   Tylenol  Firocet  Topamax 100 mg July 10 increase by PCP  Sumatriptan   Trazadone  Lexapro  Mirtazipine   Rizatriptan    Social History  Alcohol - denies  Smoke - daily 1 pack in 1/2 a day   Recreational Drug Use- denies    For patient's behavioral health diagnosis he follows up closely with Psychiatry currently on Vraylar, trazodone and Lexapro.  Of note patient also reports tremor in bilateral upper extremities while performing activity reports his grandmother also has tremors these tremors have been present since age of 15 he has not been seen by Neurology or taking any pharmaceutical medicine to treat these symptoms.    Review of patient's allergies indicates:   Allergen Reactions    Adhesive      Current Medications[1]    Medical History     Past Medical History:   Diagnosis Date    Asthma     CHF (congestive heart failure)     Chronic back pain     COPD (chronic obstructive pulmonary disease)     Depression     Diabetes mellitus     Hypertension     Insomnia     Migraines     Mixed hyperlipidemia     Pneumonia     Unspecified viral hepatitis C without hepatic coma     treated     History reviewed. No pertinent surgical history.  Family History   Problem Relation Name Age of Onset    Hypertension Mother      Heart disease Father      COPD Father       Social History[2]    Exam     Vitals:    07/18/25 0803   BP: 119/67   Pulse: 83      Physical Exam:  General: Not in acute distress. Not ill-appearing.   HENT: Normocephalic and atraumatic. Moist mucous membranes.  Eyes: Conjunctivae normal.   Pulmonary: Pulmonary effort is normal.   Skin: Skin is warm and dry. No rashes.   Psychiatric: Flat Affect       Neurologic Exam   Mental status: oriented to person, place, and time  Attention: Normal. Concentration: normal.  Speech: speech is normal.  Cranial Nerves: EOMI intact, V1-V3 Facial sensation intact. Symmetric facies. Hearing grossly intact. Palate and uvula midline, symmetric. No  tongue deviation. Trapezius strength intact.     Motor exam: bulk and tone normal. Strength 5/5 in bilateral upper extremities: deltoids, biceps, triceps, wrist flexion/extension, finger abduction/adduction. Strength 5/5 in bilateral lower extremities: hip flexion/extension, thigh adduction/abduction, knee flexion/extension, dorsiflexion/plantarflexion, foot eversion/inversion.    Reflexes: 2+ in bilateral upper extremities: biceps and brachiaradialis, 1/2+ in bilateral lower extremities: patellar and achilles  Plantar reflex: normal  D[facility]'s/Clonus: negative    Sensory exam: light touch intact    Gait exam: normal  Romberg: negative  Coordination: normal    Tremor: bilateral upper extremity action/postural   Cogwheel rigidity: none    Labs and Imaging     Labs: reviewed  Lab Results   Component Value Date    HGBA1C 13.2 (H) 06/25/2023 7/2024 hemoglobin A1c 11.7    Imaging:   I have personally reviewed the images performed.     Other procedures: reviewed    Assessment and Plan     Problem List Items Addressed This Visit    None  Visit Diagnoses         Migraine with aura and without status migrainosus, not intractable    -  Primary    Relevant Medications    ubrogepant (UBRELVY) 100 mg tablet      Benign essential tremor          Chronic migraine without aura without status migrainosus, not intractable              Mr. Rogers is a 50-year-old male new to me who presents for evaluation and recommendations of headache. Patient reports a 2 year history of headaches/migraines with onset inciting factor of presentation during intimacy.  Previous headache history consistent with migraines recent increased in frequency.  Location unilateral left temporalis retro-orbital and holocephalic described as pounding, pulsating, sharp ache.  Duration of headache hours, intensity range 7-10/10 with sec of time to peak to most intense.  Frequency 5/30 headache days per month with 5 of those being debilitating.  Known triggers  of bright lights strong smells sexual intercourse.  Aggravating factors of lights and smells alleviating factors sleep lying down in dark room cold compress.  No recent changes denies prodrome or aura denies headache on today time of day headaches can occurs anytime.  Denies snoring does not wake feeling refreshed but can get resolution of headache with sleep.  Associated symptoms reported as photophobia, Osmophobia, nasal drainage impaired concentration.  Reported symptoms negative of increased intracranial pressure, cranial autonomic, vestibular, basilar, TCA features.  Physical exam with bilateral upper extremity action/postural tremor patient has family history of benign tremor has been present since age 15 he has not been seen by Neurology or taking any pharmaceutical medicine to treat these symptoms. Remaining physical exam unremarkable.     PLAN:    My approach to every patient is multimodal.I focused our discussion on adressing modifiable risk factors and trying to decrease them.  - Discussed symptoms appear to be consistent with primary headache migraine w/ some tension type features, discussed treatment options and patient agreed with the following plan:    Patient's primary care provider is currently treating migraines discussed with the patient safety concern in multiple providers treating diagnosis.  He will discuss with the primary care provider in regards to continuing care for migraines or allowing Neurology to continuing treat this diagnosis long-term. Will discuss at follow up.  Patient is currently on preventative topiramate 100 mg daily with recent increase 8 days ago we will continue this.  Patient is currently not on any abortive I will initiate abortive on today.    We will discuss treatment of benign essential tremor at follow up.  Beta-blockers are contraindicated as patient has a history of asthma.    Preventive medications; these medications have to be taken every single day to decrease  headache frequency and severity; it takes at least minimum of few weeks to see any results; and have to be taken for at least 1 to 2 years. The medication should be started at a small dose, and increased if no significant side effects. Patient compliance is key for effectiveness of the preventive medications. (we discussed the options of beta-blockers, calcium channel blockers, SSRIs, SNRIs, neuron modulating like topiramate options)     Failed:   Trazodone, Lexapro, mirtazapine    Patient has tried and failed 2 or more first line mediation management options for preventation of migrane; therefore it is medically necessary tat they take CGRP antagonist for preention of migrane.    CONTINUE:    Topiramate 100 mg as prescribed by PCP    Discussed Natural approaches to increasing brain energy and serotonin:   Magnesium 400 mg daily  Vitamin B2 400 mg daily   Vitamin B12 1000 mcg daily     Acute (abortive) medications- these medications are to be taken only at the onset of severe headache and please limit a total of any combination of these medications to less than 6 days per month on average because they can cause rebound (medication overuse) headaches.     Failed:   Tylenol, Fioricet, sumatriptan, rizatriptan    If needed patient has tried and failed multiple triptans for acute relief of migrine and therefore it is medically necessary that they be on CGRP antagonist for acute relief.     START:   Ubrelvy 100 mg as needed    DIET: I recommend a diet that heavily favors fruits and vegetables while reducing carbs. More information is available upon request.     EXERCISE: Gentle movement exercises, concentrate on combination of muscle building and aerobic. I also recommend adding gentle Yoga therapy. The goal is 150 minutes per week of moderate to rigorous exercise, but you should start at your own pace and progress slowly and safely as discussed today.    ANXIETY/STRESS- Control stressors with yoga, meditation,  counseling, or other methods of mindfulness.     SLEEP- aim for 7-8 hrs of restful sleep per night.     FUN- Increase fun time spend with friends and family     Tobacco dependency: Counseled on smoking cessation.  Tobacco dependency will increase your future stroke risk and elevated your BP. Patient current daily smoker.  Patient encouraged to work towards cessation    Benefits, side effects, and alternatives were discussed extensively with the patient.?     Patient verbally endorsed understanding of all the recommendations.?     - risks, benefits, and potential side effects of Ubrelvy discussed   - alternative treatment options offered   - Educated at length on importance of healthy diet, regular exercise and sleep hygiene in the treatment of headaches    - Start tracking headaches via Migraine Tereso clarisa on phone     I have discussed realistic goals of care with patient at length as well as medication options, and need for lifestyle adjustment. I have explained that treatment will take time. We have agreed that the goal will be to reduce frequency/intensity/quantity of HA, not to be completely HA free. I have explained my non narcotic policy regarding headache treatment.    Patient agreeable to work on lifestyle adjustments.    Questions and concerns were sought and answered to the patient's stated verbal satisfaction. The patient verbalizes understanding and agreement with the above stated treatment plan.      Visit today included increased complexity associated with the care of the episodic problem headaches addressed and managing the longitudinal care of the patient due to the serious and/or complex managed problem(s) neurological/vascular risk factors.    Thank you for allowing me to assist in Mr. Greg Rogers  's care. If you have any questions, please contact clinic @ [phone] or use of portal messaging services via electronic medical record.    PARRISH Vasquez  [facility]  [place]  [facility]     [phone]    Follow-up: Follow up in about 3 months (around 10/18/2025).    Time spent on this encounter: 60 minutes. This includes face to face time and non-face to face time preparing to see the patient (eg, review of tests), obtaining and/or reviewing separately obtained history, documenting clinical information in the electronic or other health record, independently interpreting results and communicating results to the patient/family/caregiver, or care coordinator.     This note was created by combination of typed  and M-Modal dictation. Transcription and phonetic errors may be present.  If there are any questions, please contact me.         [1]   Current Outpatient Medications   Medication Sig Dispense Refill    albuterol (PROVENTIL/VENTOLIN HFA) 90 mcg/actuation inhaler Inhale 1-2 puffs into the lungs every 6 (six) hours as needed for Wheezing or Shortness of Breath. Rescue 18 g 0    fluticasone (FLONASE) 50 mcg/actuation nasal spray 1 spray (50 mcg total) by Each Nare route 2 (two) times daily as needed for Rhinitis or Allergies. 16 g 0    insulin glargine 100 units/mL SubQ pen Inject 60 Units into the skin 2 (two) times a day.      metFORMIN (GLUCOPHAGE) 1000 MG tablet Take 1,000 mg by mouth 2 (two) times daily with meals.      metoprolol succinate (TOPROL-XL) 25 MG 24 hr tablet Take 25 mg by mouth once daily.      QUEtiapine (SEROQUEL XR) 400 MG Tb24 Take 400 mg by mouth nightly.      albuterol-ipratropium (DUO-NEB) 2.5 mg-0.5 mg/3 mL nebulizer solution Take 3 mLs by nebulization every 6 (six) hours as needed for Wheezing. Rescue 1 each 0    ondansetron (ZOFRAN) 4 MG tablet Take 1 tablet (4 mg total) by mouth every 6 (six) hours. 12 tablet 0    ubrogepant (UBRELVY) 100 mg tablet Take 1 tablet by mouth at the onset of a headache. May repeat based on response and tolerability after more than 2 hours if needed. Do not take more than 200mg in a  24 hour span. 16 tablet 1     No current facility-administered medications for this visit.   [2]   Social History  Socioeconomic History    Marital status:    Tobacco Use    Smoking status: Every Day     Current packs/day: 1.00     Types: Cigarettes    Smokeless tobacco: Never   Substance and Sexual Activity    Alcohol use: No     Comment: former     Drug use: No    Sexual activity: Yes     Partners: Female     Social Drivers of Health     Financial Resource Strain: Patient Declined (7/26/2024)    Received from [facility]    Overall Financial Resource Strain (CARDIA)     Difficulty of Paying Living Expenses: Patient declined   Food Insecurity: Patient Declined (7/26/2024)    Received from [facility]    Hunger Vital Sign     Worried About Running Out of Food in the Last Year: Patient declined     Ran Out of Food in the Last Year: Patient declined   Transportation Needs: Patient Declined (7/26/2024)    Received from [facility]    PRAPARE - Transportation     Lack of Transportation (Medical): Patient declined     Lack of Transportation (Non-Medical): Patient declined   Physical Activity: Inactive (7/26/2024)    Received from [facility]    Exercise Vital Sign     Days of Exercise per Week: 0 days     Minutes of Exercise per Session: 0 min   Stress: Stress Concern Present (7/26/2024)    Received from [facility]    [employer] [place] [employer] - Occupational Stress Questionnaire     Feeling of Stress : Very much   Housing Stability: Patient Declined (7/26/2024)    Received from [facility]    Housing Stability Vital Sign     Unable to Pay for Housing in the Last Year: Patient declined     Number of Places Lived in the Last Year: 2     Unstable Housing in the Last Year: Patient declined